# Patient Record
Sex: MALE | Race: WHITE | HISPANIC OR LATINO | URBAN - METROPOLITAN AREA
[De-identification: names, ages, dates, MRNs, and addresses within clinical notes are randomized per-mention and may not be internally consistent; named-entity substitution may affect disease eponyms.]

---

## 2022-12-04 ENCOUNTER — EMERGENCY (EMERGENCY)
Facility: HOSPITAL | Age: 22
LOS: 1 days | Discharge: HOME | End: 2022-12-04
Attending: EMERGENCY MEDICINE
Payer: MEDICAID

## 2022-12-04 VITALS
HEIGHT: 66 IN | WEIGHT: 169.98 LBS | TEMPERATURE: 98 F | HEART RATE: 101 BPM | SYSTOLIC BLOOD PRESSURE: 126 MMHG | RESPIRATION RATE: 18 BRPM | OXYGEN SATURATION: 98 % | DIASTOLIC BLOOD PRESSURE: 68 MMHG

## 2022-12-04 VITALS
SYSTOLIC BLOOD PRESSURE: 119 MMHG | TEMPERATURE: 98 F | HEART RATE: 76 BPM | OXYGEN SATURATION: 99 % | RESPIRATION RATE: 16 BRPM | DIASTOLIC BLOOD PRESSURE: 71 MMHG

## 2022-12-04 DIAGNOSIS — Z98.89 OTHER SPECIFIED POSTPROCEDURAL STATES: Chronic | ICD-10-CM

## 2022-12-04 DIAGNOSIS — Z90.89 ACQUIRED ABSENCE OF OTHER ORGANS: Chronic | ICD-10-CM

## 2022-12-04 PROCEDURE — 99283 EMERGENCY DEPT VISIT LOW MDM: CPT

## 2022-12-04 RX ORDER — OFLOXACIN 0.3 %
2 DROPS OPHTHALMIC (EYE) ONCE
Refills: 0 | Status: DISCONTINUED | OUTPATIENT
Start: 2022-12-04 | End: 2022-12-04

## 2022-12-04 RX ORDER — POLYMYXIN B SULF/TRIMETHOPRIM 10000-1/ML
1 DROPS OPHTHALMIC (EYE) ONCE
Refills: 0 | Status: COMPLETED | OUTPATIENT
Start: 2022-12-04 | End: 2022-12-04

## 2022-12-04 RX ADMIN — Medication 1 DROP(S): at 20:23

## 2022-12-04 NOTE — ED PROVIDER NOTE - ATTENDING CONTRIBUTION TO CARE
RGUJRAL 21yo male hx listed presents with L eye conjunctival injection x 2 d. Denies any pain, blurry vision, discharge. No URI symptoms, HA, neck pain. No sick contact. + Mild tearing. On exam, + injection, no cell/flare. VA noted. Full EOMI wo any pain. No fluorescin uptake. PERRL. No temporal ttp.  Pt well appearing. Conjunctivitis. Discussed treatment plan w warm compresses, monitoring of symptoms, follow up and return precautions.

## 2022-12-04 NOTE — ED PROVIDER NOTE - PROGRESS NOTE DETAILS
Javier Baker, PGY2 pt appears to have conjunctivitis. will give polymyxin eye drop and ophthalmology f/u

## 2022-12-04 NOTE — ED PROVIDER NOTE - PATIENT PORTAL LINK FT
You can access the FollowMyHealth Patient Portal offered by [facility] by registering at the following website: [email or web address]. By joining [facility]’s FollowMyHealth portal, you will also be able to view your health information using other applications (apps) compatible with our system.

## 2022-12-04 NOTE — ED PROVIDER NOTE - PHYSICAL EXAMINATION
Const: Well-nourished, Well-developed, appearing stated age.  Eyes: left eye injected, limbic sparred. 20/20 b/l eye vision. EOMI, PERRLA.   Fluorescin no uptake.   HEENT: Head NCAT, no lesions. Atraumatic external nose and ears. Moist MM.  Neck: Symmetric, trachea midline.   RESP: Unlabored respiratory effort.   MSK: Normocephalic/Atraumatic, Lower Extremities w/o calf tenderness or edema b/l.   Skin: Warm, dry and intact.   Neuro: CNs II-XII grossly intact. Motor & Sensation grossly intact.  Psych: Awake, Alert, & Oriented (AAO) x3. Appropriate mood and affect.

## 2022-12-04 NOTE — ED PROVIDER NOTE - OBJECTIVE STATEMENT
22y M w/ PMHx of Gastritis, Crohn's disease presents to the ED c/o L eye pain and redness x2days a/w mild photosensitivity, HA. Denies previous pinkeye. Denies contact use, pt does wear glasses. Denies recent trauma. Denies sick contact. Denies FB sensation. Denies purulent discharge, F/C, blurred vision. 22y M w/ PMHx of Gastritis, Crohn's disease presents to the ED c/o L eye swelling and redness x2days a/w mild photosensitivity, HA. Pt works as an . Denies previous pinkeye. Denies contact use, pt does wear glasses. Denies recent trauma. Denies sick contact. Denies FB sensation. Denies purulent discharge, F/C, blurred vision, cough, sore throat, ear pain.

## 2022-12-04 NOTE — ED PROVIDER NOTE - NSICDXFAMILYHX_GEN_ALL_CORE_FT
FAMILY HISTORY:  Father  Still living? Unknown  Family history of ulcerative colitis, Age at diagnosis: Age Unknown    Uncle  Still living? Unknown  Family history of pancreatitis, Age at diagnosis: Age Unknown

## 2022-12-04 NOTE — ED PROVIDER NOTE - NSFOLLOWUPCLINICS_GEN_ALL_ED_FT
[facility] - Ophthalmology  Ophthalmology  [address]  [place] [address]  Phone: [phone]  Fax:

## 2022-12-04 NOTE — ED ADULT NURSE NOTE - OBJECTIVE STATEMENT
22y male no PMH to the ED c/o of eye redness in the left eye since Saturday. Pt woke up and eye was red, teary, and painful. Pt reports eye gets crust development when pt falls asleep. No upper respiratory symptoms. Only other complaint is headache. Has not seen any other doctor/ urgent care for complaint. Stretcher in lowest position and locked, appropriate side rails in place, room cleared of clutter and safety hazards, call bell in reach- pt oriented to use, blankets given for comfort

## 2022-12-04 NOTE — ED PROVIDER NOTE - CLINICAL SUMMARY MEDICAL DECISION MAKING FREE TEXT BOX
pt is a 21 yo m glasses wear who presnts with left eye injection. Pt likely has conjunctivitis. will give eye drop meds and opthalmology f/u.

## 2022-12-04 NOTE — ED PROVIDER NOTE - NSFOLLOWUPINSTRUCTIONS_ED_ALL_ED_FT
Please apply 2 drops in your eye 4 times a day.   You will need to follow up with ophthalmologist in the next few days. The hospital will call to help you make an appointment. You may apply a warm compress over your eye for relief.   Please see your primary doctor in 2-3 days for follow-up care. Return to ER for any new or worsening symptoms including blurred vision, inability to see, or worsening pain.

## 2023-10-25 ENCOUNTER — INPATIENT (INPATIENT)
Facility: HOSPITAL | Age: 23
LOS: 3 days | Discharge: HOME | DRG: 387 | End: 2023-10-29
Attending: HOSPITALIST | Admitting: STUDENT IN AN ORGANIZED HEALTH CARE EDUCATION/TRAINING PROGRAM
Payer: MEDICAID

## 2023-10-25 VITALS
DIASTOLIC BLOOD PRESSURE: 76 MMHG | SYSTOLIC BLOOD PRESSURE: 126 MMHG | HEART RATE: 83 BPM | TEMPERATURE: 98 F | WEIGHT: 160.94 LBS | RESPIRATION RATE: 16 BRPM | OXYGEN SATURATION: 99 %

## 2023-10-25 DIAGNOSIS — Z90.89 ACQUIRED ABSENCE OF OTHER ORGANS: Chronic | ICD-10-CM

## 2023-10-25 DIAGNOSIS — Z98.89 OTHER SPECIFIED POSTPROCEDURAL STATES: Chronic | ICD-10-CM

## 2023-10-25 LAB
ALBUMIN SERPL ELPH-MCNC: 4 G/DL — SIGNIFICANT CHANGE UP (ref 3.3–5)
ALBUMIN SERPL ELPH-MCNC: 4 G/DL — SIGNIFICANT CHANGE UP (ref 3.3–5)
ALP SERPL-CCNC: 88 U/L — SIGNIFICANT CHANGE UP (ref 40–120)
ALP SERPL-CCNC: 88 U/L — SIGNIFICANT CHANGE UP (ref 40–120)
ALT FLD-CCNC: 22 U/L — SIGNIFICANT CHANGE UP (ref 10–45)
ALT FLD-CCNC: 22 U/L — SIGNIFICANT CHANGE UP (ref 10–45)
ANION GAP SERPL CALC-SCNC: 11 MMOL/L — SIGNIFICANT CHANGE UP (ref 5–17)
ANION GAP SERPL CALC-SCNC: 11 MMOL/L — SIGNIFICANT CHANGE UP (ref 5–17)
AST SERPL-CCNC: 14 U/L — SIGNIFICANT CHANGE UP (ref 10–40)
AST SERPL-CCNC: 14 U/L — SIGNIFICANT CHANGE UP (ref 10–40)
BASOPHILS # BLD AUTO: 0.03 K/UL — SIGNIFICANT CHANGE UP (ref 0–0.2)
BASOPHILS # BLD AUTO: 0.03 K/UL — SIGNIFICANT CHANGE UP (ref 0–0.2)
BASOPHILS NFR BLD AUTO: 0.4 % — SIGNIFICANT CHANGE UP (ref 0–2)
BASOPHILS NFR BLD AUTO: 0.4 % — SIGNIFICANT CHANGE UP (ref 0–2)
BILIRUB SERPL-MCNC: 0.6 MG/DL — SIGNIFICANT CHANGE UP (ref 0.2–1.2)
BILIRUB SERPL-MCNC: 0.6 MG/DL — SIGNIFICANT CHANGE UP (ref 0.2–1.2)
BLD GP AB SCN SERPL QL: NEGATIVE — SIGNIFICANT CHANGE UP
BLD GP AB SCN SERPL QL: NEGATIVE — SIGNIFICANT CHANGE UP
BUN SERPL-MCNC: 13 MG/DL — SIGNIFICANT CHANGE UP (ref 7–23)
BUN SERPL-MCNC: 13 MG/DL — SIGNIFICANT CHANGE UP (ref 7–23)
CALCIUM SERPL-MCNC: 9.1 MG/DL — SIGNIFICANT CHANGE UP (ref 8.4–10.5)
CALCIUM SERPL-MCNC: 9.1 MG/DL — SIGNIFICANT CHANGE UP (ref 8.4–10.5)
CHLORIDE SERPL-SCNC: 101 MMOL/L — SIGNIFICANT CHANGE UP (ref 96–108)
CHLORIDE SERPL-SCNC: 101 MMOL/L — SIGNIFICANT CHANGE UP (ref 96–108)
CO2 SERPL-SCNC: 26 MMOL/L — SIGNIFICANT CHANGE UP (ref 22–31)
CO2 SERPL-SCNC: 26 MMOL/L — SIGNIFICANT CHANGE UP (ref 22–31)
CREAT SERPL-MCNC: 0.98 MG/DL — SIGNIFICANT CHANGE UP (ref 0.5–1.3)
CREAT SERPL-MCNC: 0.98 MG/DL — SIGNIFICANT CHANGE UP (ref 0.5–1.3)
EGFR: 110 ML/MIN/1.73M2 — SIGNIFICANT CHANGE UP
EGFR: 110 ML/MIN/1.73M2 — SIGNIFICANT CHANGE UP
EOSINOPHIL # BLD AUTO: 0.35 K/UL — SIGNIFICANT CHANGE UP (ref 0–0.5)
EOSINOPHIL # BLD AUTO: 0.35 K/UL — SIGNIFICANT CHANGE UP (ref 0–0.5)
EOSINOPHIL NFR BLD AUTO: 4.1 % — SIGNIFICANT CHANGE UP (ref 0–6)
EOSINOPHIL NFR BLD AUTO: 4.1 % — SIGNIFICANT CHANGE UP (ref 0–6)
GLUCOSE SERPL-MCNC: 87 MG/DL — SIGNIFICANT CHANGE UP (ref 70–99)
GLUCOSE SERPL-MCNC: 87 MG/DL — SIGNIFICANT CHANGE UP (ref 70–99)
HCT VFR BLD CALC: 42.2 % — SIGNIFICANT CHANGE UP (ref 39–50)
HCT VFR BLD CALC: 42.2 % — SIGNIFICANT CHANGE UP (ref 39–50)
HGB BLD-MCNC: 13.9 G/DL — SIGNIFICANT CHANGE UP (ref 13–17)
HGB BLD-MCNC: 13.9 G/DL — SIGNIFICANT CHANGE UP (ref 13–17)
IMM GRANULOCYTES NFR BLD AUTO: 0.4 % — SIGNIFICANT CHANGE UP (ref 0–0.9)
IMM GRANULOCYTES NFR BLD AUTO: 0.4 % — SIGNIFICANT CHANGE UP (ref 0–0.9)
LYMPHOCYTES # BLD AUTO: 1.58 K/UL — SIGNIFICANT CHANGE UP (ref 1–3.3)
LYMPHOCYTES # BLD AUTO: 1.58 K/UL — SIGNIFICANT CHANGE UP (ref 1–3.3)
LYMPHOCYTES # BLD AUTO: 18.7 % — SIGNIFICANT CHANGE UP (ref 13–44)
LYMPHOCYTES # BLD AUTO: 18.7 % — SIGNIFICANT CHANGE UP (ref 13–44)
MAGNESIUM SERPL-MCNC: 1.8 MG/DL — SIGNIFICANT CHANGE UP (ref 1.6–2.6)
MAGNESIUM SERPL-MCNC: 1.8 MG/DL — SIGNIFICANT CHANGE UP (ref 1.6–2.6)
MCHC RBC-ENTMCNC: 28.3 PG — SIGNIFICANT CHANGE UP (ref 27–34)
MCHC RBC-ENTMCNC: 28.3 PG — SIGNIFICANT CHANGE UP (ref 27–34)
MCHC RBC-ENTMCNC: 32.9 GM/DL — SIGNIFICANT CHANGE UP (ref 32–36)
MCHC RBC-ENTMCNC: 32.9 GM/DL — SIGNIFICANT CHANGE UP (ref 32–36)
MCV RBC AUTO: 85.8 FL — SIGNIFICANT CHANGE UP (ref 80–100)
MCV RBC AUTO: 85.8 FL — SIGNIFICANT CHANGE UP (ref 80–100)
MONOCYTES # BLD AUTO: 1.32 K/UL — HIGH (ref 0–0.9)
MONOCYTES # BLD AUTO: 1.32 K/UL — HIGH (ref 0–0.9)
MONOCYTES NFR BLD AUTO: 15.6 % — HIGH (ref 2–14)
MONOCYTES NFR BLD AUTO: 15.6 % — HIGH (ref 2–14)
NEUTROPHILS # BLD AUTO: 5.16 K/UL — SIGNIFICANT CHANGE UP (ref 1.8–7.4)
NEUTROPHILS # BLD AUTO: 5.16 K/UL — SIGNIFICANT CHANGE UP (ref 1.8–7.4)
NEUTROPHILS NFR BLD AUTO: 60.8 % — SIGNIFICANT CHANGE UP (ref 43–77)
NEUTROPHILS NFR BLD AUTO: 60.8 % — SIGNIFICANT CHANGE UP (ref 43–77)
NRBC # BLD: 0 /100 WBCS — SIGNIFICANT CHANGE UP (ref 0–0)
NRBC # BLD: 0 /100 WBCS — SIGNIFICANT CHANGE UP (ref 0–0)
PHOSPHATE SERPL-MCNC: 3.8 MG/DL — SIGNIFICANT CHANGE UP (ref 2.5–4.5)
PHOSPHATE SERPL-MCNC: 3.8 MG/DL — SIGNIFICANT CHANGE UP (ref 2.5–4.5)
PLATELET # BLD AUTO: 316 K/UL — SIGNIFICANT CHANGE UP (ref 150–400)
PLATELET # BLD AUTO: 316 K/UL — SIGNIFICANT CHANGE UP (ref 150–400)
POTASSIUM SERPL-MCNC: 3.6 MMOL/L — SIGNIFICANT CHANGE UP (ref 3.5–5.3)
POTASSIUM SERPL-MCNC: 3.6 MMOL/L — SIGNIFICANT CHANGE UP (ref 3.5–5.3)
POTASSIUM SERPL-SCNC: 3.6 MMOL/L — SIGNIFICANT CHANGE UP (ref 3.5–5.3)
POTASSIUM SERPL-SCNC: 3.6 MMOL/L — SIGNIFICANT CHANGE UP (ref 3.5–5.3)
PROT SERPL-MCNC: 7.2 G/DL — SIGNIFICANT CHANGE UP (ref 6–8.3)
PROT SERPL-MCNC: 7.2 G/DL — SIGNIFICANT CHANGE UP (ref 6–8.3)
RBC # BLD: 4.92 M/UL — SIGNIFICANT CHANGE UP (ref 4.2–5.8)
RBC # BLD: 4.92 M/UL — SIGNIFICANT CHANGE UP (ref 4.2–5.8)
RBC # FLD: 12.4 % — SIGNIFICANT CHANGE UP (ref 10.3–14.5)
RBC # FLD: 12.4 % — SIGNIFICANT CHANGE UP (ref 10.3–14.5)
RH IG SCN BLD-IMP: POSITIVE — SIGNIFICANT CHANGE UP
RH IG SCN BLD-IMP: POSITIVE — SIGNIFICANT CHANGE UP
SODIUM SERPL-SCNC: 138 MMOL/L — SIGNIFICANT CHANGE UP (ref 135–145)
SODIUM SERPL-SCNC: 138 MMOL/L — SIGNIFICANT CHANGE UP (ref 135–145)
WBC # BLD: 8.47 K/UL — SIGNIFICANT CHANGE UP (ref 3.8–10.5)
WBC # BLD: 8.47 K/UL — SIGNIFICANT CHANGE UP (ref 3.8–10.5)
WBC # FLD AUTO: 8.47 K/UL — SIGNIFICANT CHANGE UP (ref 3.8–10.5)
WBC # FLD AUTO: 8.47 K/UL — SIGNIFICANT CHANGE UP (ref 3.8–10.5)

## 2023-10-25 PROCEDURE — 99285 EMERGENCY DEPT VISIT HI MDM: CPT

## 2023-10-25 RX ORDER — MESALAMINE 400 MG
1600 TABLET, DELAYED RELEASE (ENTERIC COATED) ORAL ONCE
Refills: 0 | Status: COMPLETED | OUTPATIENT
Start: 2023-10-25 | End: 2023-10-25

## 2023-10-25 RX ORDER — MESALAMINE 400 MG
4 TABLET, DELAYED RELEASE (ENTERIC COATED) ORAL AT BEDTIME
Refills: 0 | Status: DISCONTINUED | OUTPATIENT
Start: 2023-10-25 | End: 2023-10-29

## 2023-10-25 NOTE — ED ADULT NURSE NOTE - NSFALLUNIVINTERV_ED_ALL_ED
Bed/Stretcher in lowest position, wheels locked, appropriate side rails in place/Call bell, personal items and telephone in reach/Instruct patient to call for assistance before getting out of bed/chair/stretcher/Non-slip footwear applied when patient is off stretcher/[place] to call system/Physically safe environment - no spills, clutter or unnecessary equipment/Purposeful proactive rounding/Room/bathroom lighting operational, light cord in reach Improved.

## 2023-10-25 NOTE — ED PROVIDER NOTE - CLINICAL SUMMARY MEDICAL DECISION MAKING FREE TEXT BOX
23-year-old male PMH ulcerative colitis presenting with GI bleeding  1 week of GI bleeding GI will be consulted to confirm source of the bleeding patient will be treated with mesalamine labs will be ordered to ascertain hemodynamic stability.  Disposition likely inpatient stay

## 2023-10-25 NOTE — ED ADULT TRIAGE NOTE - CHIEF COMPLAINT QUOTE
abd cramping blood in stool x 2 weeks  10 episodes bloody stools  denies being dizzy/lighheaded/sob   h/o UC

## 2023-10-25 NOTE — ED PROVIDER NOTE - OBJECTIVE STATEMENT
23-year-old male [facility] ulcerative colitis presenting with GI bleeding  Patient has had 2 weeks of abdominal cramping and diarrhea a week ago he started having bloody diarrhea 4 episodes of diarrhea per day patient describes bright red blood in the toilet.  The patient sees Dr. Mauro for gastroenterology and is on mesalamine 1.2 g 4 times a day.  Patient denies fevers chills nausea vomiting headache dizziness shortness of breath palpitations back pain weakness paresthesias.  Patient states he has never been hospitalized for UC.

## 2023-10-25 NOTE — ED ADULT NURSE NOTE - OBJECTIVE STATEMENT
23 y.o male, A&Ox4, PMH ulcerative colitis, pt presents to ED c/o abdominal pain, bloody stools. pt states he has been having 2 weeks of abdominal cramping, diarrhea, bright red bloody stools. pt states his pain and bleeding has gotten worse which prompted ED visit. pt states this does not feel like his previous ulcerative colitis flare ups. pt denies chest pain, sob, N/V, fevers, chills. IV access established, pt safety and comfort provided.

## 2023-10-26 DIAGNOSIS — Z29.9 ENCOUNTER FOR PROPHYLACTIC MEASURES, UNSPECIFIED: ICD-10-CM

## 2023-10-26 DIAGNOSIS — K51.90 ULCERATIVE COLITIS, UNSPECIFIED, WITHOUT COMPLICATIONS: ICD-10-CM

## 2023-10-26 DIAGNOSIS — K92.2 GASTROINTESTINAL HEMORRHAGE, UNSPECIFIED: ICD-10-CM

## 2023-10-26 LAB
GI PCR PANEL: SIGNIFICANT CHANGE UP
GI PCR PANEL: SIGNIFICANT CHANGE UP

## 2023-10-26 PROCEDURE — 99222 1ST HOSP IP/OBS MODERATE 55: CPT | Mod: GC

## 2023-10-26 PROCEDURE — 99223 1ST HOSP IP/OBS HIGH 75: CPT

## 2023-10-26 PROCEDURE — 74177 CT ABD & PELVIS W/CONTRAST: CPT | Mod: 26

## 2023-10-26 RX ORDER — MESALAMINE 400 MG
1 TABLET, DELAYED RELEASE (ENTERIC COATED) ORAL
Refills: 0 | DISCHARGE

## 2023-10-26 RX ORDER — INFLUENZA VIRUS VACCINE 15; 15; 15; 15 UG/.5ML; UG/.5ML; UG/.5ML; UG/.5ML
0.5 SUSPENSION INTRAMUSCULAR ONCE
Refills: 0 | Status: DISCONTINUED | OUTPATIENT
Start: 2023-10-26 | End: 2023-10-29

## 2023-10-26 RX ORDER — MESALAMINE 400 MG
1600 TABLET, DELAYED RELEASE (ENTERIC COATED) ORAL EVERY 8 HOURS
Refills: 0 | Status: DISCONTINUED | OUTPATIENT
Start: 2023-10-26 | End: 2023-10-29

## 2023-10-26 RX ADMIN — Medication 4 GRAM(S): at 00:07

## 2023-10-26 RX ADMIN — Medication 4 GRAM(S): at 21:54

## 2023-10-26 RX ADMIN — Medication 1600 MILLIGRAM(S): at 05:01

## 2023-10-26 RX ADMIN — Medication 1600 MILLIGRAM(S): at 13:26

## 2023-10-26 RX ADMIN — Medication 1600 MILLIGRAM(S): at 21:55

## 2023-10-26 RX ADMIN — Medication 1600 MILLIGRAM(S): at 00:07

## 2023-10-26 NOTE — H&P ADULT - NSHPREVIEWOFSYSTEMS_GEN_ALL_CORE
Review of Systems:   CONSTITUTIONAL: No fever, weight loss  EYES: No eye pain, visual disturbances, or discharge  ENMT:  No difficulty hearing, tinnitus, vertigo; No sinus or throat pain  RESPIRATORY: No SOB. No cough, wheezing, chills or hemoptysis  CARDIOVASCULAR: No chest pain, palpitations, dizziness, or leg swelling  GASTROINTESTINAL: No abdominal or epigastric pain. No nausea, vomiting, or hematemesis; No diarrhea or constipation. No melena or hematochezia.  GENITOURINARY: No dysuria, frequency, hematuria, or incontinence  NEUROLOGICAL: No headaches, memory loss, loss of strength, numbness, or tremors  SKIN: No itching, burning, rashes, or lesions   LYMPH NODES: No enlarged glands  ENDOCRINE: No heat or cold intolerance; No hair loss  MUSCULOSKELETAL: No joint pain or swelling; No muscle, back pain  PSYCHIATRIC: No depression, anxiety, mood swings, or difficulty sleeping  HEME/LYMPH: No easy bruising, or bleeding gums Review of Systems:   CONSTITUTIONAL: No fever, weight loss  EYES: No eye pain, visual disturbances, or discharge  ENMT:  No difficulty hearing, tinnitus, vertigo; No sinus or throat pain  RESPIRATORY: No SOB. No cough, wheezing, chills or hemoptysis  CARDIOVASCULAR: No chest pain, palpitations, dizziness, or leg swelling  GASTROINTESTINAL: +cramping abdominal pain. No nausea, vomiting, or hematemesis; + bloody diarrhea, No constipation.  GENITOURINARY: No dysuria, frequency, hematuria, or incontinence  NEUROLOGICAL: No headaches, memory loss, loss of strength, numbness, or tremors  SKIN: No itching, burning, rashes, or lesions   MUSCULOSKELETAL: No joint pain or swelling; No muscle, back pain  PSYCHIATRIC: No depression, anxiety, mood swings, or difficulty sleeping  HEME/LYMPH: No easy bruising, or bleeding gums

## 2023-10-26 NOTE — H&P ADULT - ASSESSMENT
23 M with hx of ulcerative colitis (diagnosed 2015) presenting with abdominal cramping and bloody diarrhea concerning for ulcerative colitis flare. Pt admitted for further management.

## 2023-10-26 NOTE — H&P ADULT - HISTORY OF PRESENT ILLNESS
23 M with hx of ulcerative colitis (diagnosed 2015) presenting with abdominal cramping and bloody diarrhea.  23 M with hx of ulcerative colitis (diagnosed 2015) presenting with abdominal cramping and bloody diarrhea. Patient has been experiencing symptoms for the past 2 weeks. Started with loose stools but then started to have diffuse cramping abdominal pain. In the last few days, has progressed to niurka blood with bowel movements, BRBPR that fills toilet bowl followed by dark brown watery stool. Pt has had UC flares in the past but never to this extent; he has never been hospitalized during his previous UC flares. Pt says he last had a colonoscopy 2-3 years ago, unclear of the exact findings. Pt has been on mesalamine 1.2g four times a day. However he endorses that he does not always take it consistently. Sometimes a goes periods without taking the medication; he was taking the medication around four times a week around the time of onset of symptoms. Recently, after onset of symptoms, he has continued taking mesalamine as prescribed but has not noticed any improvement in his symptoms. Patient denied any fevers, chills, or recent illness. In ED, patient hemodynamically stable, Hgb 13.9, pt given PO mesalamine 1.6g.

## 2023-10-26 NOTE — CONSULT NOTE ADULT - ASSESSMENT
INCOMPLETE   23 M with hx of ulcerative colitis (diagnosed 2015) presenting with abdominal cramping and bloody diarrhea admitted for treatment of suspected UC flare. Patient with increase in stools over baseline (4 formed -> 5-6 loose brown -> 6-10 loose bloody) over the last 2 weeks. Patient has associated abdominal pain that occurs surrounding the times of bowel movements. Patient is on mesalamine 1.2g without history of steroid or biologic usage.  INCOMPLETE   23 M with hx of ulcerative colitis (diagnosed 2015) presenting with abdominal cramping and bloody diarrhea admitted for treatment of suspected UC flare. Patient with increase in stools over baseline (4 formed -> 5-6 loose brown -> 6-10 loose bloody) over the last 2 weeks. Patient has associated abdominal pain that occurs surrounding the times of bowel movements. Patient is on mesalamine 1.2g without history of steroid or biologic usage. Afebrile and VSS stable upon presentation. Physical examination unrevealing. Hemoglobin upon admission 13.9, electrolytes wnl, CRP 33. GI consulted for bloody diarrhea in setting of known UC.    #Bloody diarrhea  #Suspected UC Flare  - Differential includes UC flare vs less likely infectious causes (gastroenteritis, infectious colitis, C. diff)  - Patient with continued bloody stools with increased frequency over baseline (6-10 loose vs 4 formed)    Recommendations   INCOMPLETE   23 M with hx of ulcerative colitis (diagnosed 2015) presenting with abdominal cramping and bloody diarrhea admitted for treatment of suspected UC flare. Patient with increase in stools over baseline (4 formed -> 5-6 loose brown -> 6-10 loose bloody) over the last 2 weeks. Patient has associated abdominal pain that occurs surrounding the times of bowel movements. Patient is on mesalamine 1.2g without history of steroid or biologic usage. Afebrile and VSS stable upon presentation. Physical examination unrevealing. Hemoglobin upon admission 13.9, electrolytes wnl, CRP 33. GI consulted for bloody diarrhea in setting of known UC.    #Bloody diarrhea  #Suspected UC Flare  - Differential includes UC flare vs less likely infectious causes (gastroenteritis, infectious colitis, C. diff)  - Patient with continued bloody stools with increased frequency over baseline (6-10 loose vs 4 formed)    Recommendations  - C. diff studies in addition to stool culture, GI PCR,    - TB studies, quant gold  - trend hgb  - c/w oral and rectal mesalamine  - recommendations not finalized until attending attestation   INCOMPLETE   23 M with hx of ulcerative colitis (diagnosed 2015) presenting with abdominal cramping and bloody diarrhea admitted for treatment of suspected UC flare. Patient with increase in stools over baseline (4 formed -> 5-6 loose brown -> 6-10 loose bloody) over the last 2 weeks. Patient has associated abdominal pain that occurs surrounding the times of bowel movements. Patient is on mesalamine 1.2g/day without history of steroid or biologic usage. Afebrile and VSS stable upon presentation. Physical examination unrevealing. Hemoglobin upon admission 13.9, electrolytes wnl, CRP 33. GI consulted for bloody diarrhea in setting of known UC.    #Bloody diarrhea  #Suspected UC Flare  - Differential includes UC flare vs less likely infectious causes (gastroenteritis, infectious colitis, C. diff)  - Patient with continued bloody stools with increased frequency over baseline (6-10 loose vs 4 formed)    Recommendations  - C. diff studies in addition to stool culture, GI PCR,    - TB studies, quant gold  - trend hgb  - c/w oral and rectal mesalamine  - recommendations not finalized until attending attestation

## 2023-10-26 NOTE — H&P ADULT - NSHPPHYSICALEXAM_GEN_ALL_CORE
Vital Signs Last 24 Hrs  T(C): 36.8 (26 Oct 2023 01:06), Max: 36.8 (25 Oct 2023 21:00)  T(F): 98.2 (26 Oct 2023 01:06), Max: 98.2 (25 Oct 2023 21:00)  HR: 75 (26 Oct 2023 01:06) (51 - 83)  BP: 101/64 (26 Oct 2023 01:06) (101/64 - 126/76)  BP(mean): 68 (25 Oct 2023 21:41) (68 - 68)  RR: 18 (26 Oct 2023 01:06) (16 - 18)  SpO2: 97% (26 Oct 2023 01:06) (97% - 99%)    Parameters below as of 26 Oct 2023 01:06  Patient On (Oxygen Delivery Method): room air        CONSTITUTIONAL: Well-groomed, in no apparent distress  EYES: No conjunctival or scleral injection, non-icteric; PERRLA and symmetric  ENMT: No external nasal lesions; nasal mucosa not inflamed; normal dentition; no pharyngeal injection or exudates, oral mucosa with moist membranes  RESPIRATORY: Breathing comfortably; lungs CTA without wheeze/rhonchi/rales  CARDIOVASCULAR: +S1S2, RRR, no M/G/R; no carotid bruits; pedal pulses full and symmetric; no lower extremity edema  GASTROINTESTINAL: No palpable masses or tenderness, +BS throughout, no rebound/guarding; no hepatosplenomegaly; no hernia palpated  GENITOURINARY:  LYMPHATIC: No cervical LAD or tenderness; no axillary LAD or tenderness; no inguinal LAD or tenderness  MUSCULOSKELETAL: Normal gait and station; no digital clubbing or cyanosis; no paraspinal tenderness; no malalignment of extremities  SKIN: No rashes or ulcers noted; no subcutaneous nodules or induration palpable  NEUROLOGIC: CN grossly intact; sensation intact in LEs b/l to light touch; normal strength and tone  PSYCHIATRIC: A+O x 3; mood and affect appropriate; appropriate insight and judgment Vital Signs Last 24 Hrs  T(C): 36.8 (26 Oct 2023 01:06), Max: 36.8 (25 Oct 2023 21:00)  T(F): 98.2 (26 Oct 2023 01:06), Max: 98.2 (25 Oct 2023 21:00)  HR: 75 (26 Oct 2023 01:06) (51 - 83)  BP: 101/64 (26 Oct 2023 01:06) (101/64 - 126/76)  BP(mean): 68 (25 Oct 2023 21:41) (68 - 68)  RR: 18 (26 Oct 2023 01:06) (16 - 18)  SpO2: 97% (26 Oct 2023 01:06) (97% - 99%)    Parameters below as of 26 Oct 2023 01:06  Patient On (Oxygen Delivery Method): room air        CONSTITUTIONAL: Well-groomed, in no apparent distress  EYES: No conjunctival or scleral injection, non-icteric; PERRLA and symmetric  ENMT: No external nasal lesions; nasal mucosa not inflamed; no pharyngeal injection or exudates, oral mucosa with moist membranes  RESPIRATORY: Breathing comfortably; lungs CTA without wheeze/rhonchi/rales  CARDIOVASCULAR: +S1S2, RRR, no M/G/R; no lower extremity edema  GASTROINTESTINAL: No palpable masses or tenderness, +BS throughout, no rebound/guarding; no hepatosplenomegaly; no hernia palpated  LYMPHATIC: No cervical LAD or tenderness  MUSCULOSKELETAL: no paraspinal tenderness; no malalignment of extremities; no joint swelling or tenderness  SKIN: No rashes or ulcers noted  NEUROLOGIC: CN grossly intact; sensation intact in LEs b/l to light touch; normal strength and tone  PSYCHIATRIC: A+O x 3; mood and affect appropriate; appropriate insight and judgment

## 2023-10-26 NOTE — H&P ADULT - PROBLEM SELECTOR PLAN 2
DVT ppx: will hold, patient is lower risk and currently experiencing BRBPR  Diet: CLD  Dispo: pending, likely home, no PT or home needs

## 2023-10-26 NOTE — CONSULT NOTE ADULT - ATTENDING COMMENTS
Agree with above. Likely flare of ulcerative colitis, medium severity, afebrile, no leukocytosis, 6 bloody BM/day, CT shows L-sided colonic thickening.  - HBsAg  - Quantiferon Gold  - GI PCR panel, C. difficile testing  - discussed with him and his mother at bedside that, if he does not have an infectious colitis, treatment will be steroids; and if he improves, addition of another long-term agent upon outpatient follow-up. He remains apprehensive about steroid use.

## 2023-10-26 NOTE — H&P ADULT - PROBLEM SELECTOR PLAN 1
Hx of UC per patient, follows Dr. Mauro. (Pt with previous documentation saying Crohn's disease but patient says he was diagnosed with UC not Crohn's). On oral mesalamine.  - S/p oral mesalamine in ED, rectal mesalamine ordered  - Will continue PO mesalamine 1.6g TID and rectal mesalamine 4g qhs given unclear the extent of colitis involvement.  - CT A/P with IV contrast ordered and pending  - Fecal calprotectin collected and pending  - Lower suspicion for infectious etiology to diarrhea, but will check GI PCR and stool culture  - GI emailed overnight for consult  - Clear liquid diet for now in case planning for colonoscopy. Hx of UC per patient, follows Dr. Mauro. (Pt with previous documentation saying Crohn's disease but patient says he was diagnosed with UC not Crohn's). On oral mesalamine.  - S/p oral mesalamine in ED, rectal mesalamine ordered  - Will continue PO mesalamine 1.6g TID and rectal mesalamine 4g qhs given unclear the extent of colitis involvement.  - CT A/P with IV contrast ordered and pending  - Fecal calprotectin collected and pending  - Lower suspicion for infectious etiology to diarrhea, but will check GI PCR and stool culture  - GI emailed overnight for consult  - Clear liquid diet for now in case planning for colonoscopy.  - If no improvement, consider oral or topical glucocorticoids.

## 2023-10-26 NOTE — H&P ADULT - NSHPLABSRESULTS_GEN_ALL_CORE
10-25    138  |  101  |  13  ----------------------------<  87  3.6   |  26  |  0.98    Ca    9.1      25 Oct 2023 22:14  Phos  3.8     10-25  Mg     1.8     10-25    TPro  7.2  /  Alb  4.0  /  TBili  0.6  /  DBili  x   /  AST  14  /  ALT  22  /  AlkPhos  88  10-25    Magnesium: 1.8 mg/dL (10-25-23 @ 22:14)    Phosphorus: 3.8 mg/dL (10-25-23 @ 22:14)                    Urinalysis Basic - ( 25 Oct 2023 22:14 )    Color: x / Appearance: x / SG: x / pH: x  Gluc: 87 mg/dL / Ketone: x  / Bili: x / Urobili: x   Blood: x / Protein: x / Nitrite: x   Leuk Esterase: x / RBC: x / WBC x   Sq Epi: x / Non Sq Epi: x / Bacteria: x                              13.9   8.47  )-----------( 316      ( 25 Oct 2023 22:14 )             42.2     CAPILLARY BLOOD GLUCOSE 10-25    138  |  101  |  13  ----------------------------<  87  3.6   |  26  |  0.98    Ca    9.1      25 Oct 2023 22:14  Phos  3.8     10-25  Mg     1.8     10-25    TPro  7.2  /  Alb  4.0  /  TBili  0.6  /  DBili  x   /  AST  14  /  ALT  22  /  AlkPhos  88  10-25    Magnesium: 1.8 mg/dL (10-25-23 @ 22:14)    Phosphorus: 3.8 mg/dL (10-25-23 @ 22:14)                  Urinalysis Basic - ( 25 Oct 2023 22:14 )    Color: x / Appearance: x / SG: x / pH: x  Gluc: 87 mg/dL / Ketone: x  / Bili: x / Urobili: x   Blood: x / Protein: x / Nitrite: x   Leuk Esterase: x / RBC: x / WBC x   Sq Epi: x / Non Sq Epi: x / Bacteria: x                          13.9   8.47  )-----------( 316      ( 25 Oct 2023 22:14 )             42.2

## 2023-10-26 NOTE — H&P ADULT - NSHPSOCIALHISTORY_GEN_ALL_CORE
Social History:    Marital Status:   Occupation:   Lives with:    Substance Use (street drugs):   Tobacco Usage:   Alcohol Usage: Social History:    Marital Status:   Occupation:   Lives with: parents    Substance Use (street drugs): denies  Tobacco Usage: none  Alcohol Usage: none

## 2023-10-26 NOTE — CHART NOTE - NSCHARTNOTEFT_GEN_A_CORE
Patient seen and examined with his mother at his bedside. Plan as discussed w/ ACP Ermelinda Elizabeth: patient states pain has improved and he has had one BM during his ED presentation; will appreciate GI's evaluation and further recommendations -- will send infectious workup and quantiferon gold; diet advanced to regular per patient preference; continue Mesalamine PO and DE; monitor hemodynamics, renal function, electrolytes, and H&H.     Eduardo Beatty M.D.  Division of Hospital Medicine  Available on [employer] Teams Patient seen and examined with his mother at his bedside. Plan as discussed w/ ACP Ermelinda Elizabeth: patient states pain has improved and he has had one BM during his ED presentation; will appreciate GI's evaluation and further recommendations -- will send infectious workup and quantiferon gold; advance diet as tolerated per patient preference; continue Mesalamine PO and SD; monitor hemodynamics, renal function, electrolytes, and H&H.     Eduardo Beatty M.D.  Division of Hospital Medicine  Available on [employer] Teams

## 2023-10-26 NOTE — CONSULT NOTE ADULT - SUBJECTIVE AND OBJECTIVE BOX
[facility] GI CONSULTATION  HPI:  23 M with hx of ulcerative colitis (diagnosed ) presenting with abdominal cramping and bloody diarrhea. Patient has been experiencing symptoms for the past 2 weeks. Started with loose stools but then started to have diffuse cramping abdominal pain. In the last few days, has progressed to niurka blood with bowel movements, BRBPR that fills toilet bowl followed by dark brown watery stool. Pt has had UC flares in the past but never to this extent; he has never been hospitalized during his previous UC flares. Pt says he last had a colonoscopy 2-3 years ago, unclear of the exact findings. Pt has been on mesalamine 1.2g four times a day. However he endorses that he does not always take it consistently. Sometimes a goes periods without taking the medication; he was taking the medication around four times a week around the time of onset of symptoms. Recently, after onset of symptoms, he has continued taking mesalamine as prescribed but has not noticed any improvement in his symptoms. Patient denied any fevers, chills, or recent illness. In ED, patient hemodynamically stable, Hgb 13.9, pt given PO mesalamine 1.6g.  (26 Oct 2023 01:16)        ASA/NSAIDs/anticoagulation:     Labs/Imaging reviewed.                        13.9   8.47  )-----------( 316      ( 25 Oct 2023 22:14 )             42.2     Last Hb:Hemoglobin: 13.9 g/dL (10-25-23 @ 22:14)               138   |  101   |  13                 Ca: 9.1    BMP:   ----------------------------< 87     M.8   (10-25-23 @ 22:14)             3.6    |  26    | 0.98               Ph: 3.8      LFT:     TPro: 7.2 / Alb: 4.0 / TBili: 0.6 / DBili: x / AST: 14 / ALT: 22 / AlkPhos: 88   (10-25-23 @ 22:14)    Creatinine: 0.98 mg/dL      BUN/Cr: Blood Urea Nitrogen: 13 mg/dL (10-25-23 @ 22:14)  /Creatinine: 0.98 mg/dL (10-25-23 @ 22:14)    AST/ALTAspartate Aminotransferase (AST/SGOT): 14 U/L (10-25-23 @ 22:14)  /Alanine Aminotransferase (ALT/SGPT): 22 U/L (10-25-23 @ 22:14)    ALP Alkaline Phosphatase: 88 U/L (10-25-23 @ 22:14)    T/Dbili /  INR:     EGD/[place]:      Imaging:  CT Abdomen and Pelvis w/ IV Cont:   ACC: [id number] EXAM:  CT ABDOMEN AND PELVIS IC   ORDERED BY:  KRISTOPHER ROLLINS     PROCEDURE DATE:  10/26/2023          INTERPRETATION:  CLINICAL INFORMATION: Gastrointestinal bleeding.    COMPARISON: CT abdomen pelvis 2016    CONTRAST/COMPLICATIONS:  IV Contrast: Omnipaque 350  90 cc administered   10 cc discarded  Oral Contrast: NONE  Complications: None reported at time of study completion    PROCEDURE:  CT of the Abdomen and Pelvis was performed.  Precontrast, Arterial and Delayed phases were performed.  Sagittal and coronal reformats were performed.    FINDINGS:  LOWER CHEST: Within normal limits.    LIVER: Within normal limits.  BILE DUCTS: Normal caliber.  GALLBLADDER: Within normal limits.  SPLEEN: Within normal limits.  PANCREAS: Prominence of the pancreatic tail is again seen, however,   without associated inflammatory changes or fluid tracking in the left   anterior pararenal space.  ADRENALS: Within normal limits.  KIDNEYS/URETERS: Within normal limits.    BLADDER: Underdistended which limits evaluation of the wall thickness.  REPRODUCTIVE ORGANS: Within normal limits.    BOWEL: No bowel obstruction. Appendix is not discretely visualized on the   current exam. No secondary signs of acute appendicitis in the right lower   quadrant. No extravasation of IV contrast into bowel lumen to indicate   active gastrointestinal bleeding. Wall thickening in the distal   transverse colon through to the rectum. Mild pericolonic inflammatory   changes. New nonspecific lymph nodes in the mesocolon, particularly   around the splenic texture measuring up to 11 mm.  PERITONEUM: No ascites.  VESSELS: Within normal limits.  RETROPERITONEUM/LYMPH NODES: No lymphadenopathy.  ABDOMINAL WALL: Within normal limits.  BONES: Within normal limits.    IMPRESSION:  No evidence of active gastrointestinal bleeding. Colitis extending from   the distal transverse colon through to rectum, likely inflammatory   representing a UC flare. Nonspecific numerous lymph nodes in the   left-sided nasal colon.    --- End of Report ---            DAVID CHRISTIE MD; Attending Radiologist  This document has been electronically signed. Oct 26 2023  2:46AM (10-26-23 @ 01:38)      FamHx: ***no h/o GI malignancies known  PMH/PSH:  PAST MEDICAL & SURGICAL HISTORY:  Gastritis      Ulcerative colitis      History of tonsillectomy      History of endoscopy  1 upper, 1 lower          MEDS:  MEDICATIONS  (STANDING):  mesalamine DR Capsule 1600 milliGRAM(s) Oral every 8 hours  mesalamine Enema 4 Gram(s) Rectal at bedtime    MEDICATIONS  (PRN):    Allergies    No Known Allergies    Intolerances        ROS neg except as listed above  Neg for F/C, weight loss, vision changes, SOB, GARRISON, CP, LE edema. GI ROS as listed in HPI    ______________________________________________________________________  PHYSICAL EXAM:  T(C): 36.7 (10-26-23 @ 04:50), Max: 36.8 (10-25-23 @ 21:00)  HR: 55 (10-26-23 @ 04:50)  BP: 105/68 (10-26-23 @ 04:50)  RR: 18 (10-26-23 @ 04:50)  SpO2: 97% (10-26-23 @ 04:50)  Wt(kg): --    GEN: NAD, normocephalic  CVS: Normal rate, HD stable  CHEST: No signs of respiratory distress, breathing comfortably, no accessory muscle usage  ABD: soft , nontender, nondistended, bowel sounds present  EXTR: no cyanosis, no clubbing, no edema  NEURO: Awake and alert, conversant  SKIN:  warm;  non icteric     [facility] GI CONSULTATION  HPI:  23 M with hx of ulcerative colitis (diagnosed ) presenting with abdominal cramping and bloody diarrhea. Patient has been experiencing symptoms for the past 2 weeks. Started with loose stools but then started to have diffuse cramping abdominal pain. In the last few days, has progressed to niurka blood with bowel movements, BRBPR that fills toilet bowl followed by dark brown watery stool. Pt has had UC flares in the past but never to this extent; he has never been hospitalized during his previous UC flares. Pt says he last had a colonoscopy 2-3 years ago, unclear of the exact findings. Pt has been on mesalamine 1.2g four times a day. However he endorses that he does not always take it consistently. Sometimes a goes periods without taking the medication; he was taking the medication around four times a week around the time of onset of symptoms. Recently, after onset of symptoms, he has continued taking mesalamine as prescribed but has not noticed any improvement in his symptoms. Patient denied any fevers, chills, or recent illness. In ED, patient hemodynamically stable, Hgb 13.9, pt given PO mesalamine 1.6g.  (26 Oct 2023 01:16)    Patient seen and examined 10/26. Patient is a 22yo M with PMH of UC (dx in ) who was in [facility] until 2 weeks PTA. He reports 2 weeks ago began to have 4-5 loose stools per day. He reports he stopped taking mesalamine for a few days and had improvement to 4 formed stools over the last weekend. Patient describes that his symptoms then worsened and he began to take mesalamine again. He describes having 6-10 bright red bloody loose stools per day over the last few days, blood described as filling the toilet bowl; occasionally he continues to sit on toilet and has loose brown watery stools following. Patient describes having cramping lower abdominal pain that begins when he needs to have a bowel movement and improving after completing a bowel movement. Patient endorses prior UC flares where he would have spots of blood and loose stool for ~1 week but reports this episode is much worse. Patient without any prior history of hospitalization or steroid usage to manage UC. At baseline, patient reports having 4 formed stools per day without any blood. Patient denies fever, chills, headache, recent travel, recent antibiotic usage. Patient denies tobacco usage. Patient with FHx of UC in his father. Patient follows with Dr. Mauro outpt for GI and most recent colonoscopy was performed 2-3 years ago, although he is unsure of the results.    Labs/Imaging reviewed.                        13.9   8.47  )-----------( 316      ( 25 Oct 2023 22:14 )             42.2     Last Hb:Hemoglobin: 13.9 g/dL (10-25-23 @ 22:14)               138   |  101   |  13                 Ca: 9.1    BMP:   ----------------------------< 87     M.8   (10-25-23 @ 22:14)             3.6    |  26    | 0.98               Ph: 3.8      LFT:     TPro: 7.2 / Alb: 4.0 / TBili: 0.6 / DBili: x / AST: 14 / ALT: 22 / AlkPhos: 88   (10-25-23 @ 22:14)    Creatinine: 0.98 mg/dL      BUN/Cr: Blood Urea Nitrogen: 13 mg/dL (10-25-23 @ 22:14)  /Creatinine: 0.98 mg/dL (10-25-23 @ 22:14)    AST/ALTAspartate Aminotransferase (AST/SGOT): 14 U/L (10-25-23 @ 22:14)  /Alanine Aminotransferase (ALT/SGPT): 22 U/L (10-25-23 @ 22:14)    ALP Alkaline Phosphatase: 88 U/L (10-25-23 @ 22:14)    T/Dbili /  INR:     EGD/[place]: Last performed 2-3 years ago outpt with Dr. Mauro, unknown results.    Imaging:  CT Abdomen and Pelvis w/ IV Cont:   ACC: [id number] EXAM:  CT ABDOMEN AND PELVIS IC   ORDERED BY:  KRISOTPHER ROLLINS     PROCEDURE DATE:  10/26/2023      INTERPRETATION:  CLINICAL INFORMATION: Gastrointestinal bleeding.    COMPARISON: CT abdomen pelvis 2016    CONTRAST/COMPLICATIONS:  IV Contrast: Omnipaque 350  90 cc administered   10 cc discarded  Oral Contrast: NONE  Complications: None reported at time of study completion    PROCEDURE:  CT of the Abdomen and Pelvis was performed.  Precontrast, Arterial and Delayed phases were performed.  Sagittal and coronal reformats were performed.    FINDINGS:  LOWER CHEST: Within normal limits.    LIVER: Within normal limits.  BILE DUCTS: Normal caliber.  GALLBLADDER: Within normal limits.  SPLEEN: Within normal limits.  PANCREAS: Prominence of the pancreatic tail is again seen, however,   without associated inflammatory changes or fluid tracking in the left   anterior pararenal space.  ADRENALS: Within normal limits.  KIDNEYS/URETERS: Within normal limits.    BLADDER: Underdistended which limits evaluation of the wall thickness.  REPRODUCTIVE ORGANS: Within normal limits.    BOWEL: No bowel obstruction. Appendix is not discretely visualized on the   current exam. No secondary signs of acute appendicitis in the right lower   quadrant. No extravasation of IV contrast into bowel lumen to indicate   active gastrointestinal bleeding. Wall thickening in the distal   transverse colon through to the rectum. Mild pericolonic inflammatory   changes. New nonspecific lymph nodes in the mesocolon, particularly   around the splenic texture measuring up to 11 mm.  PERITONEUM: No ascites.  VESSELS: Within normal limits.  RETROPERITONEUM/LYMPH NODES: No lymphadenopathy.  ABDOMINAL WALL: Within normal limits.  BONES: Within normal limits.    IMPRESSION:  No evidence of active gastrointestinal bleeding. Colitis extending from   the distal transverse colon through to rectum, likely inflammatory   representing a UC flare. Nonspecific numerous lymph nodes in the   left-sided nasal colon.    --- End of Report ---            DAVID CHRISTIE MD; Attending Radiologist  This document has been electronically signed. Oct 26 2023  2:46AM (10-26-23 @ 01:38)      FamHx: UC in father  PAST MEDICAL & SURGICAL HISTORY:  Gastritis      Ulcerative colitis      History of tonsillectomy      History of endoscopy  1 upper, 1 lower          MEDS:  MEDICATIONS  (STANDING):  mesalamine DR Capsule 1600 milliGRAM(s) Oral every 8 hours  mesalamine Enema 4 Gram(s) Rectal at bedtime    MEDICATIONS  (PRN):    Allergies    No Known Allergies    Intolerances        ROS neg except as listed above  Neg for F/C, weight loss, vision changes, SOB, GARRISON, CP, LE edema. GI ROS as listed in HPI    ______________________________________________________________________  PHYSICAL EXAM:  T(C): 36.7 (10-26-23 @ 04:50), Max: 36.8 (10-25-23 @ 21:00)  HR: 55 (10-26-23 @ 04:50)  BP: 105/68 (10-26-23 @ 04:50)  RR: 18 (10-26-23 @ 04:50)  SpO2: 97% (10-26-23 @ 04:50)  Wt(kg): --    GEN: NAD, normocephalic  CVS: Normal rate, HD stable  CHEST: No signs of respiratory distress, breathing comfortably, no accessory muscle usage  ABD: soft , nondistended, bowel sounds present. +mild ttp LLQ and mid lower quadrant with voluntary guarding  EXTR: no cyanosis, no clubbing, no edema  NEURO: Awake and alert, conversant  SKIN:  warm;  non icteric     [facility] GI CONSULTATION  HPI:   Patient is a 22yo M with PMH of UC (dx in ) who was in [facility] until 2 weeks PTA. He reports 2 weeks ago began to have 4-5 loose stools per day. At baseline, patient reports having 4 formed stools per day without any blood. He reports he stopped taking mesalamine for a few days and had improvement to 4 formed stools over the last weekend. Patient describes that his symptoms then worsened and he began to take mesalamine again. He describes having 6-10 bright red bloody loose stools per day over the last few days, blood described as filling the toilet bowl; occasionally he continues to sit on toilet and has loose brown watery stools following. Patient describes having cramping lower abdominal pain that begins when he needs to have a bowel movement and improving after completing a bowel movement. Patient endorses prior UC flares where he would have spots of blood and loose stool for ~1 week but reports this episode is much worse.  Patient without any prior history of hospitalization or steroid usage to manage UC.  Patient denies fever, chills, headache, recent travel, recent antibiotic usage. Patient denies tobacco usage. Patient with FHx of UC in his father. Patient follows with Dr. Mauro outpt for GI and most recent colonoscopy was performed 2-3 years ago, although he is unsure of the results. He follows w/ Dr. Mauro.        FamHx: UC in father  PAST MEDICAL & SURGICAL HISTORY:  Gastritis      Ulcerative colitis      History of tonsillectomy      History of endoscopy  1 upper, 1 lower          MEDS:  MEDICATIONS  (STANDING):  mesalamine DR Capsule 1600 milliGRAM(s) Oral every 8 hours  mesalamine Enema 4 Gram(s) Rectal at bedtime    MEDICATIONS  (PRN):    Allergies    No Known Allergies    Intolerances        ROS neg except as listed above  Neg for F/C, weight loss, vision changes, SOB, GARRISON, CP, LE edema. GI ROS as listed in HPI    ______________________________________________________________________  PHYSICAL EXAM:  T(C): 36.7 (10-26-23 @ 04:50), Max: 36.8 (10-25-23 @ 21:00)  HR: 55 (10-26-23 @ 04:50)  BP: 105/68 (10-26-23 @ 04:50)  RR: 18 (10-26-23 @ 04:50)  SpO2: 97% (10-26-23 @ 04:50)  Wt(kg): --    GEN: NAD, normocephalic  CVS: Normal rate, HD stable  CHEST: No signs of respiratory distress, breathing comfortably, no accessory muscle usage  ABD: soft , nondistended, bowel sounds present. +mild ttp LLQ and mid lower quadrant with voluntary guarding  EXTR: no cyanosis, no clubbing, no edema  NEURO: Awake and alert, conversant  SKIN:  warm;  non icteric    Labs/Imaging reviewed.                        13.9   8.47  )-----------( 316      ( 25 Oct 2023 22:14 )             42.2     Last Hb:Hemoglobin: 13.9 g/dL (10-25-23 @ 22:14)               138   |  101   |  13                 Ca: 9.1    BMP:   ----------------------------< 87     M.8   (10-25-23 @ 22:14)             3.6    |  26    | 0.98               Ph: 3.8      LFT:     TPro: 7.2 / Alb: 4.0 / TBili: 0.6 / DBili: x / AST: 14 / ALT: 22 / AlkPhos: 88   (10-25-23 @ 22:14)    Creatinine: 0.98 mg/dL      BUN/Cr: Blood Urea Nitrogen: 13 mg/dL (10-25-23 @ 22:14)  /Creatinine: 0.98 mg/dL (10-25-23 @ 22:14)    AST/ALTAspartate Aminotransferase (AST/SGOT): 14 U/L (10-25-23 @ 22:14)  /Alanine Aminotransferase (ALT/SGPT): 22 U/L (10-25-23 @ 22:14)    ALP Alkaline Phosphatase: 88 U/L (10-25-23 @ 22:14)    T/Dbili /  INR:     EGD/[place]: Last performed 2-3 years ago outpt with Dr. Mauro, unknown results.    Imaging:  CT Abdomen and Pelvis w/ IV Cont:   ACC: [id number] EXAM:  CT ABDOMEN AND PELVIS IC   ORDERED BY:  KRISTOPHER ROLLINS     PROCEDURE DATE:  10/26/2023      INTERPRETATION:  CLINICAL INFORMATION: Gastrointestinal bleeding.    COMPARISON: CT abdomen pelvis 2016    CONTRAST/COMPLICATIONS:  IV Contrast: Omnipaque 350  90 cc administered   10 cc discarded  Oral Contrast: NONE  Complications: None reported at time of study completion    PROCEDURE:  CT of the Abdomen and Pelvis was performed.  Precontrast, Arterial and Delayed phases were performed.  Sagittal and coronal reformats were performed.    FINDINGS:  LOWER CHEST: Within normal limits.    LIVER: Within normal limits.  BILE DUCTS: Normal caliber.  GALLBLADDER: Within normal limits.  SPLEEN: Within normal limits.  PANCREAS: Prominence of the pancreatic tail is again seen, however,   without associated inflammatory changes or fluid tracking in the left   anterior pararenal space.  ADRENALS: Within normal limits.  KIDNEYS/URETERS: Within normal limits.    BLADDER: Underdistended which limits evaluation of the wall thickness.  REPRODUCTIVE ORGANS: Within normal limits.    BOWEL: No bowel obstruction. Appendix is not discretely visualized on the   current exam. No secondary signs of acute appendicitis in the right lower   quadrant. No extravasation of IV contrast into bowel lumen to indicate   active gastrointestinal bleeding. Wall thickening in the distal   transverse colon through to the rectum. Mild pericolonic inflammatory   changes. New nonspecific lymph nodes in the mesocolon, particularly   around the splenic texture measuring up to 11 mm.  PERITONEUM: No ascites.  VESSELS: Within normal limits.  RETROPERITONEUM/LYMPH NODES: No lymphadenopathy.  ABDOMINAL WALL: Within normal limits.  BONES: Within normal limits.    IMPRESSION:  No evidence of active gastrointestinal bleeding. Colitis extending from   the distal transverse colon through to rectum, likely inflammatory   representing a UC flare. Nonspecific numerous lymph nodes in the   left-sided nasal colon.    --- End of Report ---

## 2023-10-27 LAB
ANION GAP SERPL CALC-SCNC: 14 MMOL/L — SIGNIFICANT CHANGE UP (ref 5–17)
ANION GAP SERPL CALC-SCNC: 14 MMOL/L — SIGNIFICANT CHANGE UP (ref 5–17)
BASOPHILS # BLD AUTO: 0.03 K/UL — SIGNIFICANT CHANGE UP (ref 0–0.2)
BASOPHILS # BLD AUTO: 0.03 K/UL — SIGNIFICANT CHANGE UP (ref 0–0.2)
BASOPHILS NFR BLD AUTO: 0.4 % — SIGNIFICANT CHANGE UP (ref 0–2)
BASOPHILS NFR BLD AUTO: 0.4 % — SIGNIFICANT CHANGE UP (ref 0–2)
BUN SERPL-MCNC: 9 MG/DL — SIGNIFICANT CHANGE UP (ref 7–23)
BUN SERPL-MCNC: 9 MG/DL — SIGNIFICANT CHANGE UP (ref 7–23)
CALCIUM SERPL-MCNC: 9.7 MG/DL — SIGNIFICANT CHANGE UP (ref 8.4–10.5)
CALCIUM SERPL-MCNC: 9.7 MG/DL — SIGNIFICANT CHANGE UP (ref 8.4–10.5)
CHLORIDE SERPL-SCNC: 99 MMOL/L — SIGNIFICANT CHANGE UP (ref 96–108)
CHLORIDE SERPL-SCNC: 99 MMOL/L — SIGNIFICANT CHANGE UP (ref 96–108)
CO2 SERPL-SCNC: 24 MMOL/L — SIGNIFICANT CHANGE UP (ref 22–31)
CO2 SERPL-SCNC: 24 MMOL/L — SIGNIFICANT CHANGE UP (ref 22–31)
CREAT SERPL-MCNC: 0.98 MG/DL — SIGNIFICANT CHANGE UP (ref 0.5–1.3)
CREAT SERPL-MCNC: 0.98 MG/DL — SIGNIFICANT CHANGE UP (ref 0.5–1.3)
EGFR: 110 ML/MIN/1.73M2 — SIGNIFICANT CHANGE UP
EGFR: 110 ML/MIN/1.73M2 — SIGNIFICANT CHANGE UP
EOSINOPHIL # BLD AUTO: 0.32 K/UL — SIGNIFICANT CHANGE UP (ref 0–0.5)
EOSINOPHIL # BLD AUTO: 0.32 K/UL — SIGNIFICANT CHANGE UP (ref 0–0.5)
EOSINOPHIL NFR BLD AUTO: 4 % — SIGNIFICANT CHANGE UP (ref 0–6)
EOSINOPHIL NFR BLD AUTO: 4 % — SIGNIFICANT CHANGE UP (ref 0–6)
GLUCOSE SERPL-MCNC: 85 MG/DL — SIGNIFICANT CHANGE UP (ref 70–99)
GLUCOSE SERPL-MCNC: 85 MG/DL — SIGNIFICANT CHANGE UP (ref 70–99)
HBV SURFACE AG SER-ACNC: SIGNIFICANT CHANGE UP
HBV SURFACE AG SER-ACNC: SIGNIFICANT CHANGE UP
HCT VFR BLD CALC: 42.6 % — SIGNIFICANT CHANGE UP (ref 39–50)
HCT VFR BLD CALC: 42.6 % — SIGNIFICANT CHANGE UP (ref 39–50)
HGB BLD-MCNC: 14.7 G/DL — SIGNIFICANT CHANGE UP (ref 13–17)
HGB BLD-MCNC: 14.7 G/DL — SIGNIFICANT CHANGE UP (ref 13–17)
IMM GRANULOCYTES NFR BLD AUTO: 0.4 % — SIGNIFICANT CHANGE UP (ref 0–0.9)
IMM GRANULOCYTES NFR BLD AUTO: 0.4 % — SIGNIFICANT CHANGE UP (ref 0–0.9)
LYMPHOCYTES # BLD AUTO: 1.57 K/UL — SIGNIFICANT CHANGE UP (ref 1–3.3)
LYMPHOCYTES # BLD AUTO: 1.57 K/UL — SIGNIFICANT CHANGE UP (ref 1–3.3)
LYMPHOCYTES # BLD AUTO: 19.8 % — SIGNIFICANT CHANGE UP (ref 13–44)
LYMPHOCYTES # BLD AUTO: 19.8 % — SIGNIFICANT CHANGE UP (ref 13–44)
MAGNESIUM SERPL-MCNC: 2 MG/DL — SIGNIFICANT CHANGE UP (ref 1.6–2.6)
MAGNESIUM SERPL-MCNC: 2 MG/DL — SIGNIFICANT CHANGE UP (ref 1.6–2.6)
MCHC RBC-ENTMCNC: 28.9 PG — SIGNIFICANT CHANGE UP (ref 27–34)
MCHC RBC-ENTMCNC: 28.9 PG — SIGNIFICANT CHANGE UP (ref 27–34)
MCHC RBC-ENTMCNC: 34.5 GM/DL — SIGNIFICANT CHANGE UP (ref 32–36)
MCHC RBC-ENTMCNC: 34.5 GM/DL — SIGNIFICANT CHANGE UP (ref 32–36)
MCV RBC AUTO: 83.7 FL — SIGNIFICANT CHANGE UP (ref 80–100)
MCV RBC AUTO: 83.7 FL — SIGNIFICANT CHANGE UP (ref 80–100)
MONOCYTES # BLD AUTO: 0.9 K/UL — SIGNIFICANT CHANGE UP (ref 0–0.9)
MONOCYTES # BLD AUTO: 0.9 K/UL — SIGNIFICANT CHANGE UP (ref 0–0.9)
MONOCYTES NFR BLD AUTO: 11.4 % — SIGNIFICANT CHANGE UP (ref 2–14)
MONOCYTES NFR BLD AUTO: 11.4 % — SIGNIFICANT CHANGE UP (ref 2–14)
NEUTROPHILS # BLD AUTO: 5.07 K/UL — SIGNIFICANT CHANGE UP (ref 1.8–7.4)
NEUTROPHILS # BLD AUTO: 5.07 K/UL — SIGNIFICANT CHANGE UP (ref 1.8–7.4)
NEUTROPHILS NFR BLD AUTO: 64 % — SIGNIFICANT CHANGE UP (ref 43–77)
NEUTROPHILS NFR BLD AUTO: 64 % — SIGNIFICANT CHANGE UP (ref 43–77)
NRBC # BLD: 0 /100 WBCS — SIGNIFICANT CHANGE UP (ref 0–0)
NRBC # BLD: 0 /100 WBCS — SIGNIFICANT CHANGE UP (ref 0–0)
PHOSPHATE SERPL-MCNC: 3.7 MG/DL — SIGNIFICANT CHANGE UP (ref 2.5–4.5)
PHOSPHATE SERPL-MCNC: 3.7 MG/DL — SIGNIFICANT CHANGE UP (ref 2.5–4.5)
PLATELET # BLD AUTO: 307 K/UL — SIGNIFICANT CHANGE UP (ref 150–400)
PLATELET # BLD AUTO: 307 K/UL — SIGNIFICANT CHANGE UP (ref 150–400)
POTASSIUM SERPL-MCNC: 3.5 MMOL/L — SIGNIFICANT CHANGE UP (ref 3.5–5.3)
POTASSIUM SERPL-MCNC: 3.5 MMOL/L — SIGNIFICANT CHANGE UP (ref 3.5–5.3)
POTASSIUM SERPL-SCNC: 3.5 MMOL/L — SIGNIFICANT CHANGE UP (ref 3.5–5.3)
POTASSIUM SERPL-SCNC: 3.5 MMOL/L — SIGNIFICANT CHANGE UP (ref 3.5–5.3)
RBC # BLD: 5.09 M/UL — SIGNIFICANT CHANGE UP (ref 4.2–5.8)
RBC # BLD: 5.09 M/UL — SIGNIFICANT CHANGE UP (ref 4.2–5.8)
RBC # FLD: 12.3 % — SIGNIFICANT CHANGE UP (ref 10.3–14.5)
RBC # FLD: 12.3 % — SIGNIFICANT CHANGE UP (ref 10.3–14.5)
SODIUM SERPL-SCNC: 137 MMOL/L — SIGNIFICANT CHANGE UP (ref 135–145)
SODIUM SERPL-SCNC: 137 MMOL/L — SIGNIFICANT CHANGE UP (ref 135–145)
WBC # BLD: 7.92 K/UL — SIGNIFICANT CHANGE UP (ref 3.8–10.5)
WBC # BLD: 7.92 K/UL — SIGNIFICANT CHANGE UP (ref 3.8–10.5)
WBC # FLD AUTO: 7.92 K/UL — SIGNIFICANT CHANGE UP (ref 3.8–10.5)
WBC # FLD AUTO: 7.92 K/UL — SIGNIFICANT CHANGE UP (ref 3.8–10.5)

## 2023-10-27 PROCEDURE — 99233 SBSQ HOSP IP/OBS HIGH 50: CPT

## 2023-10-27 PROCEDURE — 99231 SBSQ HOSP IP/OBS SF/LOW 25: CPT | Mod: GC

## 2023-10-27 RX ADMIN — Medication 1600 MILLIGRAM(S): at 05:47

## 2023-10-27 RX ADMIN — Medication 1600 MILLIGRAM(S): at 22:26

## 2023-10-27 RX ADMIN — Medication 1600 MILLIGRAM(S): at 15:16

## 2023-10-27 RX ADMIN — Medication 20 MILLIGRAM(S): at 22:27

## 2023-10-27 RX ADMIN — Medication 4 GRAM(S): at 22:46

## 2023-10-27 NOTE — PROVIDER CONTACT NOTE (OTHER) - ASSESSMENT
AAOx4, all VSS. Pt had bloody BM, photo sent to provider via Teams. Pt denies weakness, light-headedness.

## 2023-10-27 NOTE — CHART NOTE - NSCHARTNOTEFT_GEN_A_CORE
As per ALLIE vega the stool cultures for c-diff ; the lab refused sample since it is not liquid , informed Dr. Jon from GI.

## 2023-10-27 NOTE — PROGRESS NOTE ADULT - ASSESSMENT
23yoM w/ PMHx of ulcerative colitis (diagnosed 2015) presenting with abdominal cramping and bloody diarrhea concerning for ulcerative colitis flare.

## 2023-10-27 NOTE — PROGRESS NOTE ADULT - SUBJECTIVE AND OBJECTIVE BOX
Interval Events:   -Still w/ 3 bloody BMs yesterday and abdominal pain. Getting home mesalamine    Hospital Medications:  influenza   Vaccine 0.5 milliLiter(s) IntraMuscular once  mesalamine DR Capsule 1600 milliGRAM(s) Oral every 8 hours  mesalamine Enema 4 Gram(s) Rectal at bedtime      ROS: All system reviewed and negative except as mentioned above.  mesalamine DR Capsule: 1600 milliGRAM(s) Oral (10-26-23 @ 13:26)  mesalamine Enema: 4 Gram(s) Rectal (10-26-23 @ 21:54)  mesalamine DR Capsule: 1600 milliGRAM(s) Oral (10-26-23 @ 21:55)  mesalamine DR Capsule: 1600 milliGRAM(s) Oral (10-27-23 @ 05:47)        Diet, Regular (10-27-23 @ 08:13)  Diet, Regular (10-26-23 @ 13:39)  Diet, Full Liquid (10-26-23 @ 13:39)              PHYSICAL EXAM:   Vital Signs:  Vital Signs Last 24 Hrs  T(C): 36.7 (27 Oct 2023 10:22), Max: 36.8 (26 Oct 2023 16:58)  T(F): 98 (27 Oct 2023 10:22), Max: 98.3 (26 Oct 2023 16:58)  HR: 69 (27 Oct 2023 10:22) (52 - 69)  BP: 121/71 (27 Oct 2023 10:22) (100/60 - 121/71)  BP(mean): --  RR: 18 (27 Oct 2023 10:22) (18 - 18)  SpO2: 97% (27 Oct 2023 10:22) (95% - 99%)    Parameters below as of 27 Oct 2023 10:22  Patient On (Oxygen Delivery Method): room air      Daily     Daily   GENERAL:  NAD, Appears stated age  HEENT:  NC/AT,  conjunctivae clear and pink, sclera -anicteric  CHEST:  Normal Effort, no signs of resp distress  HEART:  RRR, HD stable  ABDOMEN:  Soft, non-tender, non-distended  EXTREMITIES:  no cyanosis or edema  SKIN:  Warm & Dry. No rash or erythema  NEURO:  Alert, oriented, no focal deficit  LABS:                        14.7   7.92  )-----------( 307      ( 27 Oct 2023 07:12 )             42.6     Last Hb:Hemoglobin: 14.7 g/dL (10-27-23 @ 07:12)  Hemoglobin: 13.9 g/dL (10-25-23 @ 22:14)               137   |  99    |  9                  Ca: 9.7    BMP:   ----------------------------< 85     M.0   (10-27-23 @ 07:15)             3.5    |  24    | 0.98               Ph: 3.7      LFT:     TPro: 7.2 / Alb: 4.0 / TBili: 0.6 / DBili: x / AST: 14 / ALT: 22 / AlkPhos: 88   (10-25-23 @ 22:14)    Creatinine: 0.98 mg/dL  Creatinine: 0.98 mg/dL      LIVER FUNCTIONS - ( 25 Oct 2023 22:14 )  Alb: 4.0 g/dL / Pro: 7.2 g/dL / ALK PHOS: 88 U/L / ALT: 22 U/L / AST: 14 U/L / GGT: x             Urinalysis Basic - ( 27 Oct 2023 07:15 )    Color: x / Appearance: x / SG: x / pH: x  Gluc: 85 mg/dL / Ketone: x  / Bili: x / Urobili: x   Blood: x / Protein: x / Nitrite: x   Leuk Esterase: x / RBC: x / WBC x   Sq Epi: x / Non Sq Epi: x / Bacteria: x        Imaging: Images reviewed.

## 2023-10-27 NOTE — CHART NOTE - NSCHARTNOTEFT_GEN_A_CORE
As advised by Dr Jon ( gastroenterology) to initiate po methylprednisolone for UC flare up. Patient has been informed .

## 2023-10-27 NOTE — PROGRESS NOTE ADULT - ASSESSMENT
23 M with hx of ulcerative colitis (diagnosed 2015) presenting with abdominal cramping and bloody diarrhea admitted for treatment of suspected UC flare. Patient with increase in stools over baseline (4 formed -> 5-6 loose brown -> 6-10 loose bloody) over the last 2 weeks. Patient has associated abdominal pain that occurs surrounding the times of bowel movements. Patient is on mesalamine 1.2g/day without history of steroid or biologic usage. Afebrile and VSS stable upon presentation. Physical examination unrevealing. Hemoglobin upon admission 13.9, electrolytes wnl, CRP 33. GI consulted for bloody diarrhea in setting of known UC.    #Bloody diarrhea  #Suspected UC Flare  - Differential includes UC flare vs less likely infectious causes (gastroenteritis, C. diff)  - Patient with continued bloody stools with increased frequency over baseline (6-10 loose vs 4 formed)    Recommendations  - F/u c. ciff  - TB studies, quant gold  - trend hgb  - c/w oral and rectal mesalamine  - recommendations not finalized until attending attestation    Note incomplete until finalized by attending signature/attestation.    Lizbeth Hogan  GI/Hepatology Fellow PGY5    NON-URGENT CONSULTS:  Please email [email or web address]  AT NIGHT AND ON WEEKENDS:  Available on Microsoft Teams  [phone] (Long Range Pager)    After 5pm, please contact the on-call GI fellow. [phone] 23 M with hx of ulcerative colitis (diagnosed 2015) presenting with abdominal cramping and bloody diarrhea admitted for treatment of suspected UC flare.           #Bloody diarrhea  #Suspected UC Flare  Patient with increase in stools over baseline (4 formed -> 5-6 loose brown -> 6-10 loose bloody) over the last 2 weeks a/w abdominal pain . On mesalamine 1.2g/day without history of steroid or biologic usage. Afebrile and VSS stable . Hemoglobin upon admission 13.9, electrolytes wnl, CRP 33. Pending C. diff. Clinically stable.  - Differential includes UC flare vs less likely infectious causes (gastroenteritis, C. diff)      Recommendations  - F/u c. ciff  - F/u quant gold  - trend hgb  - c/w oral and rectal mesalamine  - may consider addition of steroids pending c. diff testing    Note incomplete until finalized by attending signature/attestation.    Lizbeth Hogan  GI/Hepatology Fellow PGY5    NON-URGENT CONSULTS:  Please email [email or web address] OR [email or web address]  AT NIGHT AND ON WEEKENDS:  Available on Microsoft Teams  [phone] (Long Range Pager)    After 5pm, please contact the on-call GI fellow. [phone] 23 M with hx of ulcerative colitis (diagnosed 2015) presenting with abdominal cramping and bloody diarrhea admitted for treatment of suspected UC flare.           #Bloody diarrhea  #Suspected UC Flare  Patient with increase in stools over baseline (4 formed -> 5-6 loose brown -> 6-10 loose bloody) over the last 2 weeks a/w abdominal pain . On mesalamine 1.2g/day without history of steroid or biologic usage. Afebrile and VSS stable . Hemoglobin upon admission 13.9, electrolytes wnl, CRP 33. Pending C. diff. Clinically stable.  - Differential includes UC flare vs less likely infectious causes (gastroenteritis, C. diff)    - HBsAg negative    Recommendations  - F/u c. ciff  - F/u quant gold    - trend hgb  - c/w oral and rectal mesalamine  -will need addition of steroids if C diff. testing negative - will discuss with patient again        Lizbeth Hogan  GI/Hepatology Fellow PGY5    NON-URGENT CONSULTS:  Please email [email or web address]  AT NIGHT AND ON WEEKENDS:  Available on Microsoft Teams  [phone] (Long Range Pager)    After 5pm, please contact the on-call GI fellow. [phone]

## 2023-10-27 NOTE — PROGRESS NOTE ADULT - SUBJECTIVE AND OBJECTIVE BOX
INCOMPLETE NOTE    Eduardo Beatty M.D.  Division of Hospital Medicine  Available on Microsoft TEAMS    SUBJECTIVE / ACUTE INTERVAL EVENTS: Patient seen and examined. No overnight events except for noted bloody bowel movement.     OBJECTIVE:   Vital Signs Last 24 Hrs  T(F): 97.7 (27 Oct 2023 05:23), Max: 98.3 (26 Oct 2023 16:58)  HR: 57 (27 Oct 2023 05:23) (52 - 67)  BP: 102/52 (27 Oct 2023 05:23) (100/60 - 112/70)  RR: 18 (27 Oct 2023 05:23) (18 - 18)  SpO2: 97% (27 Oct 2023 05:23) (95% - 99%)  Parameters below as of 27 Oct 2023 05:23  Patient On (Oxygen Delivery Method): room air    I&O's Summary  26 Oct 2023 07:01  -  27 Oct 2023 07:00  --------------------------------------------------------  IN: 0 mL / OUT: 600 mL / NET: -600 mL    Physical Examination:  GEN: young man, laying in bed in mild distress  PSYCH: A&Ox3, mood and affect appear appropriate   NEURO: no focal neurologic deficits appreciated  RESPI: no accessory muscle use, B/L air entry   CARDIO: regular rate/rhythm, no LE edema B/L  ABD: soft, NT, ND  EXT: patient able to move all extremities spontaneously  VASC: peripheral pulses palpated    Labs:                     14.7   7.92  )-----------( 307      ( 27 Oct 2023 07:12 )             42.6     10-27  137  |  99  |  9   ----------------------------<  85  3.5   |  24  |  0.98    Ca    9.7      27 Oct 2023 07:15  Phos  3.7     10-27  Mg     2.0     10-27    TPro  7.2  /  Alb  4.0  /  TBili  0.6  /  DBili  x   /  AST  14  /  ALT  22  /  AlkPhos  88  10-25    Urinalysis Basic - ( 27 Oct 2023 07:15 )  Color: x / Appearance: x / SG: x / pH: x  Gluc: 85 mg/dL / Ketone: x  / Bili: x / Urobili: x   Blood: x / Protein: x / Nitrite: x   Leuk Esterase: x / RBC: x / WBC x   Sq Epi: x / Non Sq Epi: x / Bacteria: x    Consultant(s) Notes Reviewed: GI  Care Discussed with Consultants/Other Providers: CRISTA Cee    MEDICATIONS  (STANDING):  influenza   Vaccine 0.5 milliLiter(s) IntraMuscular once  mesalamine DR Capsule 1600 milliGRAM(s) Oral every 8 hours  mesalamine Enema 4 Gram(s) Rectal at bedtime   Eduardo Beatty M.D.  Division of Hospital Medicine  Available on [employer] TEAMS    SUBJECTIVE / ACUTE INTERVAL EVENTS: Patient seen and examined. No overnight events except for noted bloody bowel movement x1 overnight. Patient wants to attempt regular diet today. GI requesting sending stool for Cdiff before consideration of steroids to be discussed with the patient.     OBJECTIVE:   Vital Signs Last 24 Hrs  T(F): 97.7 (27 Oct 2023 05:23), Max: 98.3 (26 Oct 2023 16:58)  HR: 57 (27 Oct 2023 05:23) (52 - 67)  BP: 102/52 (27 Oct 2023 05:23) (100/60 - 112/70)  RR: 18 (27 Oct 2023 05:23) (18 - 18)  SpO2: 97% (27 Oct 2023 05:23) (95% - 99%)  Parameters below as of 27 Oct 2023 05:23  Patient On (Oxygen Delivery Method): room air    I&O's Summary  26 Oct 2023 07:01  -  27 Oct 2023 07:00  --------------------------------------------------------  IN: 0 mL / OUT: 600 mL / NET: -600 mL    Physical Examination:  GEN: young man, laying in bed in mild distress  PSYCH: A&Ox3, mood and affect appear appropriate   NEURO: no focal neurologic deficits appreciated  RESPI: no accessory muscle use, B/L air entry   CARDIO: regular rate/rhythm, no LE edema B/L  ABD: soft, NT, ND  EXT: patient able to move all extremities spontaneously  VASC: peripheral pulses palpated    Labs:                     14.7   7.92  )-----------( 307      ( 27 Oct 2023 07:12 )             42.6     10-27  137  |  99  |  9   ----------------------------<  85  3.5   |  24  |  0.98    Ca    9.7      27 Oct 2023 07:15  Phos  3.7     10-27  Mg     2.0     10-27    TPro  7.2  /  Alb  4.0  /  TBili  0.6  /  DBili  x   /  AST  14  /  ALT  22  /  AlkPhos  88  10-25    Urinalysis Basic - ( 27 Oct 2023 07:15 )  Color: x / Appearance: x / SG: x / pH: x  Gluc: 85 mg/dL / Ketone: x  / Bili: x / Urobili: x   Blood: x / Protein: x / Nitrite: x   Leuk Esterase: x / RBC: x / WBC x   Sq Epi: x / Non Sq Epi: x / Bacteria: x    Consultant(s) Notes Reviewed: GI  Care Discussed with Consultants/Other Providers: CRISTA Cee    MEDICATIONS  (STANDING):  influenza   Vaccine 0.5 milliLiter(s) IntraMuscular once  mesalamine DR Capsule 1600 milliGRAM(s) Oral every 8 hours  mesalamine Enema 4 Gram(s) Rectal at bedtime

## 2023-10-28 LAB
CULTURE RESULTS: SIGNIFICANT CHANGE UP
CULTURE RESULTS: SIGNIFICANT CHANGE UP
SPECIMEN SOURCE: SIGNIFICANT CHANGE UP
SPECIMEN SOURCE: SIGNIFICANT CHANGE UP

## 2023-10-28 PROCEDURE — 99233 SBSQ HOSP IP/OBS HIGH 50: CPT

## 2023-10-28 PROCEDURE — 99232 SBSQ HOSP IP/OBS MODERATE 35: CPT | Mod: GC

## 2023-10-28 RX ORDER — ENOXAPARIN SODIUM 100 MG/ML
40 INJECTION SUBCUTANEOUS EVERY 24 HOURS
Refills: 0 | Status: DISCONTINUED | OUTPATIENT
Start: 2023-10-28 | End: 2023-10-29

## 2023-10-28 RX ADMIN — Medication 20 MILLIGRAM(S): at 21:31

## 2023-10-28 RX ADMIN — ENOXAPARIN SODIUM 40 MILLIGRAM(S): 100 INJECTION SUBCUTANEOUS at 14:43

## 2023-10-28 RX ADMIN — Medication 4 GRAM(S): at 21:31

## 2023-10-28 RX ADMIN — Medication 1600 MILLIGRAM(S): at 21:30

## 2023-10-28 RX ADMIN — Medication 1600 MILLIGRAM(S): at 05:50

## 2023-10-28 RX ADMIN — Medication 20 MILLIGRAM(S): at 14:41

## 2023-10-28 RX ADMIN — Medication 20 MILLIGRAM(S): at 05:50

## 2023-10-28 RX ADMIN — Medication 1600 MILLIGRAM(S): at 14:41

## 2023-10-28 NOTE — PROGRESS NOTE ADULT - NSPROGADDITIONALINFOA_GEN_ALL_CORE
Pt medicated per mar. She is sitting up in bed, nadn. Vss.    The necessity of the time spent during the encounter on this date of service was due to:   - Ordering, reviewing, and interpreting labs, testing, and imaging  - Independently obtaining a review of systems and performing a physical exam  - Reviewing prior hospitalization and where necessary, outpatient records  - Reviewing consultant recommendations/communicating with consultants  - Counselling and educating patient and family regarding interpretation of aforementioned items and plan of care    Time-based billing (NON-critical care). Total minutes spent: 50

## 2023-10-28 NOTE — PROGRESS NOTE ADULT - ASSESSMENT
23M with ulcerative colitis (diagnosed 2015) presenting with abdominal cramping and bloody diarrhea concerning for ulcerative colitis flare.

## 2023-10-28 NOTE — PROGRESS NOTE ADULT - SUBJECTIVE AND OBJECTIVE BOX
Interval Events:   -patient feels better today on IV steroids, no BM's this AM after waking up and after breakfast, compared to 10 BM's/day he was having last week    ROS:   12 point review of systems performed and negative except otherwise noted in HPI.    Hospital Medications:  enoxaparin Injectable 40 milliGRAM(s) SubCutaneous every 24 hours  influenza   Vaccine 0.5 milliLiter(s) IntraMuscular once  mesalamine DR Capsule 1600 milliGRAM(s) Oral every 8 hours  mesalamine Enema 4 Gram(s) Rectal at bedtime  methylPREDNISolone 20 milliGRAM(s) Oral every 8 hours      PHYSICAL EXAM:   Vital Signs:  Vital Signs Last 24 Hrs  T(C): 36.7 (28 Oct 2023 16:43), Max: 37.3 (27 Oct 2023 20:24)  T(F): 98 (28 Oct 2023 16:43), Max: 99.2 (27 Oct 2023 20:24)  HR: 95 (28 Oct 2023 16:43) (55 - 100)  BP: 112/74 (28 Oct 2023 16:43) (104/63 - 120/76)  BP(mean): --  RR: 18 (28 Oct 2023 16:43) (18 - 18)  SpO2: 95% (28 Oct 2023 16:43) (95% - 97%)    Parameters below as of 28 Oct 2023 16:43  Patient On (Oxygen Delivery Method): room air      Daily     Daily     GENERAL:  NAD, Appears stated age  HEENT:  NC/AT,  conjunctivae clear and pink, sclera -anicteric  CHEST:  Normal Effort, no signs of resp distress  HEART:  RRR, HD stable  ABDOMEN:  Soft, non-tender, non-distended  EXTREMITIES:  no cyanosis or edema  SKIN:  Warm & Dry. No rash or erythema  NEURO:  Alert, oriented, no focal deficit    LABS: reviewed                        14.7   7.92  )-----------( 307      ( 27 Oct 2023 07:12 )             42.6     10-27    137  |  99  |  9   ----------------------------<  85  3.5   |  24  |  0.98    Ca    9.7      27 Oct 2023 07:15  Phos  3.7     10-27  Mg     2.0     10-27          Interval Diagnostic Studies: see sunrise for full report

## 2023-10-28 NOTE — PROGRESS NOTE ADULT - ASSESSMENT
23 M with hx of ulcerative colitis (diagnosed 2015) presenting with abdominal cramping and bloody diarrhea admitted for treatment of suspected UC flare.       #Bloody diarrhea  #Suspected UC Flare  *Patient of Dr. Mauro (GI who manages UC)  Patient with increase in stools over baseline (4 formed -> 5-6 loose brown -> 6-10 loose bloody) over the last 2 weeks a/w abdominal pain . On mesalamine 1.2g/day without history of steroid or biologic usage. Afebrile and VSS stable . Hemoglobin upon admission 13.9, electrolytes wnl, CRP 33. Clinically stable.  - Differential includes UC flare vs less likely infectious causes (gastroenteritis, C. diff)  *GI PCR and Stool culture negative  - HBsAg negative    Recommendations  - c/w IV steroids, plan for transition to PO steroids Sunday if continues to feel well  - Will plan to switch to prednisone 40mg qD with plan for 5mg/week taper thereafter  - recommend starting PPI qAM  - c/w oral and rectal mesalamine  - F/u quant gold  - trend hgb  -cw regular diet        All recommendations preliminary until note signed by service attending.    Thank you for involving us in the care of this patient. Please contact should any concern or questions arise.    Joseph Arechiga MD   Gastroenterology/Hepatology Fellow PGY-5  Available on [employer] Teams 7am - 5pm  [email or web address]    NON-URGENT CONSULTS:  Please email:  [email or web address]   OR  [email or web address]    After 5pm, please contact the on-call GI fellow. [phone]    AT NIGHT AND ON WEEKENDS:  Contact on-call GI fellow via answering service ([phone]) from 5pm-8am and on weekends/holidays

## 2023-10-28 NOTE — PROGRESS NOTE ADULT - PROBLEM SELECTOR PLAN 1
Hx of UC per patient, follows Dr. Mauro. (Pt with previous documentation saying Crohn's disease but patient says he was diagnosed with UC not Crohn's). On oral mesalamine.  - Will continue PO mesalamine 1.6g TID and rectal mesalamine 4g qhs given unclear the extent of colitis involvement.  - Fecal calprotectin sent, GI PCR negative, BM too formed for C. diff (refused by lab)  - steroids per GI  - monitor stool count and renal function/electrolytes
Hx of UC per patient, follows Dr. Mauro. (Pt with previous documentation saying Crohn's disease but patient says he was diagnosed with UC not Crohn's). On oral mesalamine.  - Will continue PO mesalamine 1.6g TID and rectal mesalamine 4g qhs given unclear the extent of colitis involvement.  - Fecal calprotectin sent, GI PCR negative, pending collection for stool for Cdiff despite lower suspicion for infectious etiology to diarrhea, per GI's recommendations  - f/u GI's recommendations to initiate steroids if patient is in agreement after infectious workup  - monitor stool count and renal function/electrolytes

## 2023-10-28 NOTE — PROGRESS NOTE ADULT - PROBLEM SELECTOR PLAN 2
DVT ppx: lovenox for UC flare  Diet: Regular  Dispo: pending, likely home, no PT or home needs
DVT ppx: will hold, patient is lower risk and currently experiencing BRBPR  Diet: Regular  Dispo: pending, likely home, no PT or home needs
no

## 2023-10-28 NOTE — PROGRESS NOTE ADULT - SUBJECTIVE AND OBJECTIVE BOX
[facility] Division of Hospital Medicine  Jos Guidry MD  Available via MS Teams    SUBJECTIVE / OVERNIGHT EVENTS: Still with 4-5 bowel movements overnight, slightly bloody. More formed. No BM today so far. Denies abd pain.     ADDITIONAL REVIEW OF SYSTEMS:    MEDICATIONS  (STANDING):  influenza   Vaccine 0.5 milliLiter(s) IntraMuscular once  mesalamine DR Capsule 1600 milliGRAM(s) Oral every 8 hours  mesalamine Enema 4 Gram(s) Rectal at bedtime  methylPREDNISolone 20 milliGRAM(s) Oral every 8 hours    MEDICATIONS  (PRN):      I&O's Summary    27 Oct 2023 07:01  -  28 Oct 2023 07:00  --------------------------------------------------------  IN: 1210 mL / OUT: 1350 mL / NET: -140 mL    28 Oct 2023 07:01  -  28 Oct 2023 13:39  --------------------------------------------------------  IN: 500 mL / OUT: 0 mL / NET: 500 mL        T(C): 37.2 (10-28-23 @ 13:02), Max: 37.3 (10-27-23 @ 20:24)  HR: 100 (10-28-23 @ 13:02) (55 - 100)  BP: 115/79 (10-28-23 @ 13:02) (104/63 - 124/65)  RR: 18 (10-28-23 @ 13:02) (18 - 18)  SpO2: 97% (10-28-23 @ 13:02) (95% - 98%)    GENERAL: NAD, walking around  CHEST/LUNG: Clear to auscultation bilaterally; No wheeze  HEART: Regular rate and rhythm; No murmurs, rubs, or gallops  ABDOMEN: Soft, Nontender, Nondistended; Bowel sounds present  EXTREMITIES:  2+ Peripheral Pulses, No clubbing, cyanosis, or edema  PSYCH: AAOx3, appropriate affect  NEUROLOGY: non-focal, talavera  SKIN: No rashes or lesions     LABS:                        14.7   7.92  )-----------( 307      ( 27 Oct 2023 07:12 )             42.6     10-27    137  |  99  |  9   ----------------------------<  85  3.5   |  24  |  0.98    Ca    9.7      27 Oct 2023 07:15  Phos  3.7     10-27  Mg     2.0     10-27            Urinalysis Basic - ( 27 Oct 2023 07:15 )    Color: x / Appearance: x / SG: x / pH: x  Gluc: 85 mg/dL / Ketone: x  / Bili: x / Urobili: x   Blood: x / Protein: x / Nitrite: x   Leuk Esterase: x / RBC: x / WBC x   Sq Epi: x / Non Sq Epi: x / Bacteria: x        Culture - Stool (collected 26 Oct 2023 15:44)  Source: .Stool Feces  Preliminary Report (27 Oct 2023 18:34):    No enteric pathogens to date: Final culture pending      RADIOLOGY & ADDITIONAL TESTS:  New Results Reviewed Today:   New Imaging Personally Reviewed Today:  New Electrocardiogram Personally Reviewed Today:  Prior or Outpatient Records Reviewed Today:    COMMUNICATION:  Care Discussed with Consultants/Other Providers and Details of Discussion: Discussed with ACP  Discussions with Patient/Family:  PCP Communication:

## 2023-10-29 ENCOUNTER — TRANSCRIPTION ENCOUNTER (OUTPATIENT)
Age: 23
End: 2023-10-29

## 2023-10-29 VITALS
OXYGEN SATURATION: 96 % | DIASTOLIC BLOOD PRESSURE: 77 MMHG | HEART RATE: 70 BPM | RESPIRATION RATE: 18 BRPM | SYSTOLIC BLOOD PRESSURE: 129 MMHG | TEMPERATURE: 99 F

## 2023-10-29 LAB
ANION GAP SERPL CALC-SCNC: 14 MMOL/L — SIGNIFICANT CHANGE UP (ref 5–17)
ANION GAP SERPL CALC-SCNC: 14 MMOL/L — SIGNIFICANT CHANGE UP (ref 5–17)
BUN SERPL-MCNC: 14 MG/DL — SIGNIFICANT CHANGE UP (ref 7–23)
BUN SERPL-MCNC: 14 MG/DL — SIGNIFICANT CHANGE UP (ref 7–23)
CALCIUM SERPL-MCNC: 9.3 MG/DL — SIGNIFICANT CHANGE UP (ref 8.4–10.5)
CALCIUM SERPL-MCNC: 9.3 MG/DL — SIGNIFICANT CHANGE UP (ref 8.4–10.5)
CHLORIDE SERPL-SCNC: 99 MMOL/L — SIGNIFICANT CHANGE UP (ref 96–108)
CHLORIDE SERPL-SCNC: 99 MMOL/L — SIGNIFICANT CHANGE UP (ref 96–108)
CO2 SERPL-SCNC: 23 MMOL/L — SIGNIFICANT CHANGE UP (ref 22–31)
CO2 SERPL-SCNC: 23 MMOL/L — SIGNIFICANT CHANGE UP (ref 22–31)
CREAT SERPL-MCNC: 0.9 MG/DL — SIGNIFICANT CHANGE UP (ref 0.5–1.3)
CREAT SERPL-MCNC: 0.9 MG/DL — SIGNIFICANT CHANGE UP (ref 0.5–1.3)
EGFR: 122 ML/MIN/1.73M2 — SIGNIFICANT CHANGE UP
EGFR: 122 ML/MIN/1.73M2 — SIGNIFICANT CHANGE UP
GLUCOSE SERPL-MCNC: 122 MG/DL — HIGH (ref 70–99)
GLUCOSE SERPL-MCNC: 122 MG/DL — HIGH (ref 70–99)
HCT VFR BLD CALC: 44.1 % — SIGNIFICANT CHANGE UP (ref 39–50)
HCT VFR BLD CALC: 44.1 % — SIGNIFICANT CHANGE UP (ref 39–50)
HGB BLD-MCNC: 14.9 G/DL — SIGNIFICANT CHANGE UP (ref 13–17)
HGB BLD-MCNC: 14.9 G/DL — SIGNIFICANT CHANGE UP (ref 13–17)
MCHC RBC-ENTMCNC: 28.3 PG — SIGNIFICANT CHANGE UP (ref 27–34)
MCHC RBC-ENTMCNC: 28.3 PG — SIGNIFICANT CHANGE UP (ref 27–34)
MCHC RBC-ENTMCNC: 33.8 GM/DL — SIGNIFICANT CHANGE UP (ref 32–36)
MCHC RBC-ENTMCNC: 33.8 GM/DL — SIGNIFICANT CHANGE UP (ref 32–36)
MCV RBC AUTO: 83.8 FL — SIGNIFICANT CHANGE UP (ref 80–100)
MCV RBC AUTO: 83.8 FL — SIGNIFICANT CHANGE UP (ref 80–100)
NRBC # BLD: 0 /100 WBCS — SIGNIFICANT CHANGE UP (ref 0–0)
NRBC # BLD: 0 /100 WBCS — SIGNIFICANT CHANGE UP (ref 0–0)
PLATELET # BLD AUTO: 367 K/UL — SIGNIFICANT CHANGE UP (ref 150–400)
PLATELET # BLD AUTO: 367 K/UL — SIGNIFICANT CHANGE UP (ref 150–400)
POTASSIUM SERPL-MCNC: 4.1 MMOL/L — SIGNIFICANT CHANGE UP (ref 3.5–5.3)
POTASSIUM SERPL-MCNC: 4.1 MMOL/L — SIGNIFICANT CHANGE UP (ref 3.5–5.3)
POTASSIUM SERPL-SCNC: 4.1 MMOL/L — SIGNIFICANT CHANGE UP (ref 3.5–5.3)
POTASSIUM SERPL-SCNC: 4.1 MMOL/L — SIGNIFICANT CHANGE UP (ref 3.5–5.3)
RBC # BLD: 5.26 M/UL — SIGNIFICANT CHANGE UP (ref 4.2–5.8)
RBC # BLD: 5.26 M/UL — SIGNIFICANT CHANGE UP (ref 4.2–5.8)
RBC # FLD: 11.9 % — SIGNIFICANT CHANGE UP (ref 10.3–14.5)
RBC # FLD: 11.9 % — SIGNIFICANT CHANGE UP (ref 10.3–14.5)
SODIUM SERPL-SCNC: 136 MMOL/L — SIGNIFICANT CHANGE UP (ref 135–145)
SODIUM SERPL-SCNC: 136 MMOL/L — SIGNIFICANT CHANGE UP (ref 135–145)
WBC # BLD: 11.6 K/UL — HIGH (ref 3.8–10.5)
WBC # BLD: 11.6 K/UL — HIGH (ref 3.8–10.5)
WBC # FLD AUTO: 11.6 K/UL — HIGH (ref 3.8–10.5)
WBC # FLD AUTO: 11.6 K/UL — HIGH (ref 3.8–10.5)

## 2023-10-29 PROCEDURE — 99232 SBSQ HOSP IP/OBS MODERATE 35: CPT | Mod: GC

## 2023-10-29 PROCEDURE — 87507 IADNA-DNA/RNA PROBE TQ 12-25: CPT

## 2023-10-29 PROCEDURE — 83631 LACTOFERRIN FECAL (QUANT): CPT

## 2023-10-29 PROCEDURE — 86140 C-REACTIVE PROTEIN: CPT

## 2023-10-29 PROCEDURE — 86850 RBC ANTIBODY SCREEN: CPT

## 2023-10-29 PROCEDURE — 83735 ASSAY OF MAGNESIUM: CPT

## 2023-10-29 PROCEDURE — 86901 BLOOD TYPING SEROLOGIC RH(D): CPT

## 2023-10-29 PROCEDURE — 87046 STOOL CULTR AEROBIC BACT EA: CPT

## 2023-10-29 PROCEDURE — 84100 ASSAY OF PHOSPHORUS: CPT

## 2023-10-29 PROCEDURE — 99239 HOSP IP/OBS DSCHRG MGMT >30: CPT

## 2023-10-29 PROCEDURE — 85027 COMPLETE CBC AUTOMATED: CPT

## 2023-10-29 PROCEDURE — 86480 TB TEST CELL IMMUN MEASURE: CPT

## 2023-10-29 PROCEDURE — 85025 COMPLETE CBC W/AUTO DIFF WBC: CPT

## 2023-10-29 PROCEDURE — 99285 EMERGENCY DEPT VISIT HI MDM: CPT

## 2023-10-29 PROCEDURE — 80053 COMPREHEN METABOLIC PANEL: CPT

## 2023-10-29 PROCEDURE — 80048 BASIC METABOLIC PNL TOTAL CA: CPT

## 2023-10-29 PROCEDURE — 83993 ASSAY FOR CALPROTECTIN FECAL: CPT

## 2023-10-29 PROCEDURE — 86900 BLOOD TYPING SEROLOGIC ABO: CPT

## 2023-10-29 PROCEDURE — 87045 FECES CULTURE AEROBIC BACT: CPT

## 2023-10-29 PROCEDURE — 87340 HEPATITIS B SURFACE AG IA: CPT

## 2023-10-29 PROCEDURE — 36415 COLL VENOUS BLD VENIPUNCTURE: CPT

## 2023-10-29 PROCEDURE — 74177 CT ABD & PELVIS W/CONTRAST: CPT | Mod: MA

## 2023-10-29 RX ORDER — PANTOPRAZOLE SODIUM 20 MG/1
1 TABLET, DELAYED RELEASE ORAL
Qty: 56 | Refills: 0
Start: 2023-10-29 | End: 2023-12-23

## 2023-10-29 RX ADMIN — Medication 40 MILLIGRAM(S): at 13:27

## 2023-10-29 RX ADMIN — Medication 1600 MILLIGRAM(S): at 06:00

## 2023-10-29 RX ADMIN — Medication 20 MILLIGRAM(S): at 05:59

## 2023-10-29 RX ADMIN — Medication 1600 MILLIGRAM(S): at 13:38

## 2023-10-29 NOTE — PROGRESS NOTE ADULT - REASON FOR ADMISSION
Abd pain, bloody diarrhea

## 2023-10-29 NOTE — DISCHARGE NOTE PROVIDER - NSDCMRMEDTOKEN_GEN_ALL_CORE_FT
mesalamine 1.2 g oral delayed release tablet: 1 tab(s) orally 4 times a day  pantoprazole 40 mg oral delayed release tablet: 1 tab(s) orally once a day in the morning before breakfast  predniSONE 5 mg oral tablet: 8 tab(s) orally once a day for 1 week; then take 1 less tablet each week (7 tabs once a day for 1 week week, then 6 tabs once a day for 1 week, etc) until complete

## 2023-10-29 NOTE — DISCHARGE NOTE PROVIDER - NSDCCPCAREPLAN_GEN_ALL_CORE_FT
PRINCIPAL DISCHARGE DIAGNOSIS  Diagnosis: Ulcerative colitis  Assessment and Plan of Treatment: You had a ulcerative colitis flare that responded well to steroids. Please continue prednisone 40 mg (8 tablets) daily for 1 week, then 35 mg (7 tablets) daily for 1 week, then 30 mg (6 tablets) daily for 1 week, then 25 mg (5 tablets) daily for 1 week, then 20 mg (4 tablets) daily for 1 week, then 15 mg (3 tablets) daily for 1 week, then 10 mg (2 tablets) daily for 1 week, then 5 mg (1 tablets) for 1 week unless otherwise directed by Dr. Mauro. Follow-up with Dr. Mauro within a week. Take pantoprazole to prevent stomach ulcers while on steroids.

## 2023-10-29 NOTE — PROGRESS NOTE ADULT - ATTENDING COMMENTS
Agree w above. UC flare with response to IV steroids. Clinically better. Plan to switch to po steroids in am if remains stable.
Agree w above. UC with clinical improvement on steroids. Rec steroid taper as outpatient. Patient will follow up with his primary GI for further management of UC.
Agree with updated assessment and plan above.

## 2023-10-29 NOTE — PROGRESS NOTE ADULT - ASSESSMENT
23 M with hx of ulcerative colitis (diagnosed 2015) presenting with abdominal cramping and bloody diarrhea admitted for treatment of suspected UC flare.     #Bloody diarrhea  #Suspected UC Flare  *Patient of Dr. Mauro (GI who manages UC)  *Patient with increase in stools over baseline (4 formed -> 5-6 loose brown -> 6-10 loose bloody) over the last 2 weeks PTA a/w abdominal pain. On mesalamine 1.2g/day without history of steroid or biologic usage. Afebrile and VSS stable . Hemoglobin upon admission 13.9, electrolytes wnl, CRP 33. Clinically stable.  - Differential includes UC flare vs less likely infectious causes  *GI PCR and Stool culture negative  - HBsAg negative    Recommendations  - please start 40mg qD prednisone today  - plan for 5mg/week taper thereafter  - hospital discharge follow up with GI Dr. Mauro this week - discuss regimen going forward (patient and father to call office tomorrow for appointment)  - send home with PPI qAM while on steroids  - c/w oral mesalamine on discharge (patient prefers not to use enema - will discuss regimen with his GI Dr. Mauro)  - F/u quant gold  - ok from GI perspective for patient to be dc home with the above measures in place      All recommendations preliminary until note signed by service attending.    Thank you for involving us in the care of this patient. Please contact should any concern or questions arise.    Joseph Arechiga MD   Gastroenterology/Hepatology Fellow PGY-5  Available on [employer] Teams 7am - 5pm  [id number]    NON-URGENT CONSULTS:  Please email:  [email or web address]  [email or web address]    After 5pm, please contact the on-call GI fellow. [phone]    AT NIGHT AND ON WEEKENDS:  Contact on-call GI fellow via answering service ([phone]) from 5pm-8am and on weekends/holidays

## 2023-10-29 NOTE — DISCHARGE NOTE PROVIDER - ATTENDING DISCHARGE PHYSICAL EXAMINATION:
Vital Signs Last 24 Hrs  T(C): 36.5 (29 Oct 2023 09:04), Max: 37.2 (28 Oct 2023 13:02)  T(F): 97.7 (29 Oct 2023 09:04), Max: 98.9 (28 Oct 2023 13:02)  HR: 50 (29 Oct 2023 09:04) (50 - 100)  BP: 90/59 (29 Oct 2023 09:04) (90/59 - 123/69)  BP(mean): --  RR: 18 (29 Oct 2023 09:04) (18 - 18)  SpO2: 96% (29 Oct 2023 09:04) (95% - 97%)    Parameters below as of 29 Oct 2023 09:04  Patient On (Oxygen Delivery Method): room air    GENERAL: NAD, well-developed  HEAD:  Atraumatic, Normocephalic  EYES: EOMI, PERRLA, conjunctiva and sclera clear  NECK: Supple, No JVD  CHEST/LUNG: Clear to auscultation bilaterally; No wheeze  HEART: Regular rate and rhythm; No murmurs, rubs, or gallops  ABDOMEN: Soft, Nontender, Nondistended; Bowel sounds present  EXTREMITIES:  2+ Peripheral Pulses, No clubbing, cyanosis, or edema  PSYCH: AAOx3, appropriate affect  NEUROLOGY: non-focal, talavera  SKIN: No rashes or lesions

## 2023-10-29 NOTE — DISCHARGE NOTE PROVIDER - HOSPITAL COURSE
HPI:  23 M with hx of ulcerative colitis (diagnosed 2015) presenting with abdominal cramping and bloody diarrhea. Patient has been experiencing symptoms for the past 2 weeks. Started with loose stools but then started to have diffuse cramping abdominal pain. In the last few days, has progressed to niurka blood with bowel movements, BRBPR that fills toilet bowl followed by dark brown watery stool. Pt has had UC flares in the past but never to this extent; he has never been hospitalized during his previous UC flares. Pt says he last had a colonoscopy 2-3 years ago, unclear of the exact findings. Pt has been on mesalamine 1.2g four times a day. However he endorses that he does not always take it consistently. Sometimes a goes periods without taking the medication; he was taking the medication around four times a week around the time of onset of symptoms. Recently, after onset of symptoms, he has continued taking mesalamine as prescribed but has not noticed any improvement in his symptoms. Patient denied any fevers, chills, or recent illness. In ED, patient hemodynamically stable, Hgb 13.9, pt given PO mesalamine 1.6g.  (26 Oct 2023 01:16)    Hospital Course: GI consulted; infectious etiology unlikely so patient was treated for UC flare with steroids. pt responded very well to steroids with improvement in symptoms. On day prior to discharge patient had two loose but nonbloody bowel movements. Pt cleared by GI for discharge on PO prednisone with close GI follow-up.       Important Medication Changes and Reason: prednisone 40 mg daily, taper 5 mg per week    Active or Pending Issues Requiring Follow-up: UC flare    Advanced Directives:   [X] Full code  [ ] DNR  [ ] Hospice    Discharge Diagnoses: UC flare

## 2023-10-29 NOTE — PROGRESS NOTE ADULT - SUBJECTIVE AND OBJECTIVE BOX
Interval Events:   -patient feels well  -3x BM yesterday much improved while on steroids  -no BM today yet  -patient sees Dr. Mauro for UC care. He was planned for colonoscopy on Tuesday, however in light of hospitalization, discussed with patient and his father, need to call [place] office Monday and make urgent appointment to discuss steroid taper + introduction of new agent to help maintain remission    ROS:   12 point review of systems performed and negative except otherwise noted in HPI.    Hospital Medications:  enoxaparin Injectable 40 milliGRAM(s) SubCutaneous every 24 hours  influenza   Vaccine 0.5 milliLiter(s) IntraMuscular once  mesalamine DR Capsule 1600 milliGRAM(s) Oral every 8 hours  mesalamine Enema 4 Gram(s) Rectal at bedtime  methylPREDNISolone 20 milliGRAM(s) Oral every 8 hours      PHYSICAL EXAM:   Vital Signs:  Vital Signs Last 24 Hrs  T(C): 36.5 (29 Oct 2023 09:04), Max: 37.2 (28 Oct 2023 13:02)  T(F): 97.7 (29 Oct 2023 09:04), Max: 98.9 (28 Oct 2023 13:02)  HR: 50 (29 Oct 2023 09:04) (50 - 100)  BP: 90/59 (29 Oct 2023 09:04) (90/59 - 123/69)  BP(mean): --  RR: 18 (29 Oct 2023 09:04) (18 - 18)  SpO2: 96% (29 Oct 2023 09:04) (95% - 97%)    Parameters below as of 29 Oct 2023 09:04  Patient On (Oxygen Delivery Method): room air      Daily     Daily     GENERAL:  NAD, Appears stated age  HEENT:  NC/AT,  conjunctivae clear and pink, sclera -anicteric  CHEST:  Normal Effort, no signs of resp distress  HEART:  RRR, HD stable  ABDOMEN:  Soft, non-tender, non-distended  EXTREMITIES:  no cyanosis or edema  SKIN:  Warm & Dry. No rash or erythema  NEURO:  Alert, oriented, no focal deficit    LABS: reviewed                        14.9   11.60 )-----------( 367      ( 29 Oct 2023 07:06 )             44.1     10-29    136  |  99  |  14  ----------------------------<  122<H>  4.1   |  23  |  0.90    Ca    9.3      29 Oct 2023 07:05          Interval Diagnostic Studies: see sunrise for full report

## 2023-10-29 NOTE — DISCHARGE NOTE NURSING/CASE MANAGEMENT/SOCIAL WORK - PATIENT PORTAL LINK FT
You can access the FollowMyHealth Patient Portal offered by [facility] by registering at the following website: [email or web address]. By joining [facility]’s FollowMyHealth portal, you will also be able to view your health information using other applications (apps) compatible with our system.

## 2023-10-29 NOTE — DISCHARGE NOTE PROVIDER - CARE PROVIDER_API CALL
Lee Mauro  Gastroenterology  [address], [address]  [place] [address]  Phone: [phone]  Fax: [phone]  Established Patient  Follow Up Time: 1-3 days

## 2023-10-30 LAB
CALPROTECTIN STL-MCNT: 2500 UG/G — HIGH (ref 0–120)
CALPROTECTIN STL-MCNT: 2500 UG/G — HIGH (ref 0–120)
GAMMA INTERFERON BACKGROUND BLD IA-ACNC: 0.02 IU/ML — SIGNIFICANT CHANGE UP
GAMMA INTERFERON BACKGROUND BLD IA-ACNC: 0.02 IU/ML — SIGNIFICANT CHANGE UP
M TB IFN-G BLD-IMP: NEGATIVE — SIGNIFICANT CHANGE UP
M TB IFN-G BLD-IMP: NEGATIVE — SIGNIFICANT CHANGE UP
M TB IFN-G CD4+ BCKGRND COR BLD-ACNC: 0 IU/ML — SIGNIFICANT CHANGE UP
M TB IFN-G CD4+ BCKGRND COR BLD-ACNC: 0 IU/ML — SIGNIFICANT CHANGE UP
M TB IFN-G CD4+CD8+ BCKGRND COR BLD-ACNC: 0 IU/ML — SIGNIFICANT CHANGE UP
M TB IFN-G CD4+CD8+ BCKGRND COR BLD-ACNC: 0 IU/ML — SIGNIFICANT CHANGE UP
QUANT TB PLUS MITOGEN MINUS NIL: 0.52 IU/ML — SIGNIFICANT CHANGE UP
QUANT TB PLUS MITOGEN MINUS NIL: 0.52 IU/ML — SIGNIFICANT CHANGE UP

## 2023-11-09 LAB
LACTOFERRIN STL-MCNC: 514.3 CD:794062635 — HIGH (ref 0–7.24)
LACTOFERRIN STL-MCNC: 514.3 CD:794062635 — HIGH (ref 0–7.24)